# Patient Record
Sex: MALE | Race: WHITE
[De-identification: names, ages, dates, MRNs, and addresses within clinical notes are randomized per-mention and may not be internally consistent; named-entity substitution may affect disease eponyms.]

---

## 2019-12-03 ENCOUNTER — HOSPITAL ENCOUNTER (EMERGENCY)
Dept: HOSPITAL 92 - ERS | Age: 64
Discharge: HOME | End: 2019-12-03
Payer: SELF-PAY

## 2019-12-03 DIAGNOSIS — E56.9: ICD-10-CM

## 2019-12-03 DIAGNOSIS — R53.1: Primary | ICD-10-CM

## 2019-12-03 DIAGNOSIS — I11.0: ICD-10-CM

## 2019-12-03 DIAGNOSIS — I50.9: ICD-10-CM

## 2019-12-03 PROCEDURE — 99281 EMR DPT VST MAYX REQ PHY/QHP: CPT

## 2019-12-13 ENCOUNTER — HOSPITAL ENCOUNTER (INPATIENT)
Dept: HOSPITAL 92 - ERS | Age: 64
LOS: 9 days | Discharge: HOME | DRG: 223 | End: 2019-12-22
Attending: INTERNAL MEDICINE | Admitting: INTERNAL MEDICINE
Payer: SELF-PAY

## 2019-12-13 VITALS — BODY MASS INDEX: 32.6 KG/M2

## 2019-12-13 DIAGNOSIS — I42.8: ICD-10-CM

## 2019-12-13 DIAGNOSIS — J40: ICD-10-CM

## 2019-12-13 DIAGNOSIS — E87.1: ICD-10-CM

## 2019-12-13 DIAGNOSIS — I48.91: ICD-10-CM

## 2019-12-13 DIAGNOSIS — R19.7: ICD-10-CM

## 2019-12-13 DIAGNOSIS — E87.6: ICD-10-CM

## 2019-12-13 DIAGNOSIS — E66.9: ICD-10-CM

## 2019-12-13 DIAGNOSIS — I34.0: ICD-10-CM

## 2019-12-13 DIAGNOSIS — I25.10: ICD-10-CM

## 2019-12-13 DIAGNOSIS — D75.1: ICD-10-CM

## 2019-12-13 DIAGNOSIS — I47.2: ICD-10-CM

## 2019-12-13 DIAGNOSIS — I50.23: ICD-10-CM

## 2019-12-13 DIAGNOSIS — I95.9: ICD-10-CM

## 2019-12-13 DIAGNOSIS — I11.0: Primary | ICD-10-CM

## 2019-12-13 LAB
ALBUMIN SERPL BCG-MCNC: 2.8 G/DL (ref 3.4–4.8)
ALP SERPL-CCNC: 72 U/L (ref 40–110)
ALT SERPL W P-5'-P-CCNC: 19 U/L (ref 8–55)
ANION GAP SERPL CALC-SCNC: 14 MMOL/L (ref 10–20)
AST SERPL-CCNC: 32 U/L (ref 5–34)
BILIRUB SERPL-MCNC: 2.3 MG/DL (ref 0.2–1.2)
BUN SERPL-MCNC: 24 MG/DL (ref 8.4–25.7)
CALCIUM SERPL-MCNC: 8.2 MG/DL (ref 7.8–10.44)
CHLORIDE SERPL-SCNC: 103 MMOL/L (ref 98–107)
CO2 SERPL-SCNC: 29 MMOL/L (ref 23–31)
CREAT CL PREDICTED SERPL C-G-VRATE: 0 ML/MIN (ref 70–130)
GLOBULIN SER CALC-MCNC: 3.1 G/DL (ref 2.4–3.5)
GLUCOSE SERPL-MCNC: 111 MG/DL (ref 80–115)
HGB BLD-MCNC: 18.3 G/DL (ref 14–18)
MCH RBC QN AUTO: 31.6 PG (ref 27–31)
MCV RBC AUTO: 97.8 FL (ref 78–98)
MDIFF COMPLETE?: YES
PLATELET # BLD AUTO: 130 THOU/UL (ref 130–400)
POTASSIUM SERPL-SCNC: 3.8 MMOL/L (ref 3.5–5.1)
RBC # BLD AUTO: 5.81 MILL/UL (ref 4.7–6.1)
SODIUM SERPL-SCNC: 142 MMOL/L (ref 136–145)
TROPONIN I SERPL DL<=0.01 NG/ML-MCNC: 0.02 NG/ML (ref ?–0.03)
TROPONIN I SERPL DL<=0.01 NG/ML-MCNC: 0.02 NG/ML (ref ?–0.03)
WBC # BLD AUTO: 7.7 THOU/UL (ref 4.8–10.8)

## 2019-12-13 PROCEDURE — 93005 ELECTROCARDIOGRAM TRACING: CPT

## 2019-12-13 PROCEDURE — 36005 INJECTION EXT VENOGRAPHY: CPT

## 2019-12-13 PROCEDURE — 80053 COMPREHEN METABOLIC PANEL: CPT

## 2019-12-13 PROCEDURE — 85049 AUTOMATED PLATELET COUNT: CPT

## 2019-12-13 PROCEDURE — 80048 BASIC METABOLIC PNL TOTAL CA: CPT

## 2019-12-13 PROCEDURE — 75820 VEIN X-RAY ARM/LEG: CPT

## 2019-12-13 PROCEDURE — 76705 ECHO EXAM OF ABDOMEN: CPT

## 2019-12-13 PROCEDURE — 85014 HEMATOCRIT: CPT

## 2019-12-13 PROCEDURE — 96374 THER/PROPH/DIAG INJ IV PUSH: CPT

## 2019-12-13 PROCEDURE — 83880 ASSAY OF NATRIURETIC PEPTIDE: CPT

## 2019-12-13 PROCEDURE — 36416 COLLJ CAPILLARY BLOOD SPEC: CPT

## 2019-12-13 PROCEDURE — 36415 COLL VENOUS BLD VENIPUNCTURE: CPT

## 2019-12-13 PROCEDURE — 85018 HEMOGLOBIN: CPT

## 2019-12-13 PROCEDURE — 84484 ASSAY OF TROPONIN QUANT: CPT

## 2019-12-13 PROCEDURE — 99153 MOD SED SAME PHYS/QHP EA: CPT

## 2019-12-13 PROCEDURE — 93798 PHYS/QHP OP CAR RHAB W/ECG: CPT

## 2019-12-13 PROCEDURE — 82565 ASSAY OF CREATININE: CPT

## 2019-12-13 PROCEDURE — 94760 N-INVAS EAR/PLS OXIMETRY 1: CPT

## 2019-12-13 PROCEDURE — 33240 INSRT PULSE GEN W/SINGL LEAD: CPT

## 2019-12-13 PROCEDURE — 93306 TTE W/DOPPLER COMPLETE: CPT

## 2019-12-13 PROCEDURE — 99152 MOD SED SAME PHYS/QHP 5/>YRS: CPT

## 2019-12-13 PROCEDURE — 71045 X-RAY EXAM CHEST 1 VIEW: CPT

## 2019-12-13 PROCEDURE — C1769 GUIDE WIRE: HCPCS

## 2019-12-13 PROCEDURE — 85027 COMPLETE CBC AUTOMATED: CPT

## 2019-12-13 PROCEDURE — 85025 COMPLETE CBC W/AUTO DIFF WBC: CPT

## 2019-12-13 PROCEDURE — 93458 L HRT ARTERY/VENTRICLE ANGIO: CPT

## 2019-12-13 RX ADMIN — Medication SCH ML: at 21:00

## 2019-12-13 NOTE — RAD
XR Chest 1 View Portable



HISTORY: Cough



COMPARISON: 1/3/2012



FINDINGS: The heart size is enlarged. The lungs are well expanded without focal areas of consolidatio
n, marcelo pulmonary edema pneumothorax or pleural effusions.



IMPRESSION: No radiographic evidence of acute cardiopulmonary process.



Reported By: Vince Calvert 

Electronically Signed:  12/13/2019 3:22 PM

## 2019-12-14 LAB
ANION GAP SERPL CALC-SCNC: 11 MMOL/L (ref 10–20)
BASOPHILS # BLD AUTO: 0.1 THOU/UL (ref 0–0.2)
BASOPHILS NFR BLD AUTO: 1.4 % (ref 0–1)
BUN SERPL-MCNC: 21 MG/DL (ref 8.4–25.7)
CALCIUM SERPL-MCNC: 8.1 MG/DL (ref 7.8–10.44)
CHLORIDE SERPL-SCNC: 100 MMOL/L (ref 98–107)
CO2 SERPL-SCNC: 34 MMOL/L (ref 23–31)
CREAT CL PREDICTED SERPL C-G-VRATE: 130 ML/MIN (ref 70–130)
CREAT CL PREDICTED SERPL C-G-VRATE: 143 ML/MIN (ref 70–130)
EOSINOPHIL # BLD AUTO: 0 THOU/UL (ref 0–0.7)
EOSINOPHIL NFR BLD AUTO: 0.6 % (ref 0–10)
GLUCOSE SERPL-MCNC: 87 MG/DL (ref 80–115)
HGB BLD-MCNC: 17.1 G/DL (ref 14–18)
HGB BLD-MCNC: 18.2 G/DL (ref 14–18)
LYMPHOCYTES # BLD: 2.3 THOU/UL (ref 1.2–3.4)
LYMPHOCYTES NFR BLD AUTO: 35.5 % (ref 21–51)
MCH RBC QN AUTO: 31.8 PG (ref 27–31)
MCV RBC AUTO: 98 FL (ref 78–98)
MONOCYTES # BLD AUTO: 0.5 THOU/UL (ref 0.11–0.59)
MONOCYTES NFR BLD AUTO: 8.3 % (ref 0–10)
NEUTROPHILS # BLD AUTO: 3.5 THOU/UL (ref 1.4–6.5)
NEUTROPHILS NFR BLD AUTO: 54.1 % (ref 42–75)
PLATELET # BLD AUTO: 123 THOU/UL (ref 130–400)
PLATELET # BLD AUTO: 135 THOU/UL (ref 130–400)
POTASSIUM SERPL-SCNC: 3.4 MMOL/L (ref 3.5–5.1)
RBC # BLD AUTO: 5.39 MILL/UL (ref 4.7–6.1)
SODIUM SERPL-SCNC: 142 MMOL/L (ref 136–145)
WBC # BLD AUTO: 6.4 THOU/UL (ref 4.8–10.8)

## 2019-12-14 RX ADMIN — ASPIRIN 81 MG CHEWABLE TABLET SCH MG: 81 TABLET CHEWABLE at 08:47

## 2019-12-14 RX ADMIN — Medication SCH ML: at 08:48

## 2019-12-14 RX ADMIN — Medication SCH ML: at 21:01

## 2019-12-14 NOTE — HP
REASON FOR ADMISSION:  Shortness of breath, swelling of lower extremities.



HISTORY OF PRESENT ILLNESS:  This is a 64-year-old male patient, presenting to the

emergency room for increased swelling of his bilateral lower extremities.  History

going back to weeks before his presentation, he has been noticing worsening of his

chronic lower extremity swelling that has started in June and the swelling was up to

his knees, then progressed to his groin and when he goes to work and stands a lot on

his feet, his scrotum becomes very swollen and for the past couple of weeks, he has

been coughing and getting more swelling extending to his abdominal area and getting

very short of breath with ambulation, unable to lay flat on his back.  Yesterday, he

had an episode of diarrhea which made his weakness and fatigue worse.  His last

bowel movement was this morning. 



The patient's last admission to the hospital was in 2012 for congestive heart

failure.  As per him, his ejection fraction was 20%.  Since then, he follows with

his primary care physician and takes medication for his high blood pressure. 



PAST MEDICAL HISTORY:  

1. High blood pressure.

2. Atrial fibrillation.  The patient is not on any anticoagulation except for

full-dose aspirin. 

3. Congestive heart failure, as per patient, EF 20%. 

4. Coronary artery disease, but no stents and no history of MI.



FAMILY HISTORY:  Borderline diabetes.



SOCIAL HISTORY:  He does not smoke.  Does not drink alcohol.



REVIEW OF SYSTEMS:  All systems reviewed except the above-mentioned, found to be

negative. 



PHYSICAL EXAMINATION:

GENERAL:  Awake, alert, oriented, does not appear in distress. 

VITAL SIGNS:  His blood pressure is 155/93, heart rate 70, afebrile, pulse ox above

90%. 

HEENT:  Head is nontraumatic, normocephalic.  Pupils equal, reactive.  Extraocular

movements are intact.  Nonicteric sclerae.  Well injected conjunctivae.  Oral mucosa

normal.  Nasal mucosa normal. 

NECK:  Supple.  No adenopathy.  No murmur.  Thyroid palpable.  Trachea is midline.

No supraclavicular adenopathy. 

CARDIAC:  S1 and S2, irregular.  No murmurs. No gallops.  No friction rubs noted.

No displacement of PMI. 

LUNGS:  Fine inspiratory crackles bilaterally. 

ABDOMEN:  Bowel sounds are positive.  Nontender abdomen. 

EXTREMITIES:  He does have 2+ pitting edema in bilateral lower extremities.  Slight

edema of his scrotum area. 

NEUROLOGICAL:  Cranial nerves 2 through 12 within normal limit.  Normal motor

function.  Normal sensory function.  Normal reflexes. 



LABORATORY DATA:  Blood work shows WBC 7.7, hemoglobin 18.3, platelets of 130.

Sodium 142, potassium 3.8, BUN 24, creatinine 0.93.  Chest x-ray shows no

radiographic evidence of acute cardiopulmonary process.  EKG as per my read shows

controlled rate, atrial fibrillation. 



ASSESSMENT AND PLAN:  This is a 64-year-old male patient presenting with increased

swelling of his bilateral lower extremities, extending to his abdominal area, also

shortness of breath, also coughing compatible with anasarca.  He does have history

of congestive heart failure with an EF of 20%. 

1. Cardiac.  The patient to be admitted to telemetry and would be on aggressive

diuresis with IV Lasix.  He is on oral Lasix at home, we will convert it to IV, we

will double the dose.  He does have history of atrial fibrillation and he is in

atrial fibrillation, but he is not on any anticoagulation.  We will start him on

Eliquis 5 mg b.i.d., we will consult Cardiology, we will do an echocardiogram, we

will continue his metoprolol and lisinopril for better blood pressure control; but

on the other hand, I will decrease his full dose aspirin to baby aspirin since he

will be on Eliquis to prevent increased risk of bleed.  We will also have him on

hydralazine as needed for high blood pressure. 

2. For deep vein thrombosis prophylaxis, he will be on Eliquis.







Job ID:  [id number]

## 2019-12-14 NOTE — CON
DATE OF CONSULTATION:  



HISTORY OF PRESENT ILLNESS:  The patient is a 64-year-old gentleman, who 
presents for evaluation of dyspnea and lower extremity swelling.  The patient 
has a history

of a cardiomyopathy. He  was seen in 2012 with a severe cardiomyopathy.  The 
patient declined to undergo an invasive evaluation.  The patient was placed on 
medical

therapy.  He has been followed by his primary physician.  The patient presents 
with increasing lower extremity swelling.  He reports being markedly dyspneic.  
The

patient has PND and orthopnea.  The patient denies having any chest discomfort. 



PAST MEDICAL HISTORY:  

1. Cardiomyopathy.

2. Atrial fibrillation.

3. Hypertension.



PAST SURGICAL HISTORY:  None.



SOCIAL HISTORY:   He is a former smoker.



MEDICATIONS: 

1. Aspirin 325 daily.

2. Lasix 40 daily.

3. Lisinopril 25 b.i.d.

4. Metoprolol 100 XL b.i.d.



FAMILY HISTORY:  No strong family history of heart disease.



ALLERGIES:  NO KNOWN DRUG ALLERGIES.



REVIEW OF SYSTEMS:  Remarkable only for nasal congestion.



PHYSICAL EXAMINATION:

GENERAL:  Obese gentleman, in no acute distress. 

VITAL SIGNS:  Blood pressure 134/84. 

NECK:  Full. 

LUNGS:  Few crackles in both bases. 

HEART:  Regular rate and rhythm.  Normal S1 and S2.  1/6 systolic murmur. 

ABDOMEN:  Distended. 

EXTREMITIES:  Showed severe bilateral edema.



LABORATORY RESULTS:  Sodium 142, potassium 3.4, chloride 100, bicarbonate 34, 
BUN

21, and creatinine 0.83.  Troponin was 0.024.  White blood cell count 6.4,

hemoglobin 17.1, hematocrit 52.8, and his platelets are 123. 



IMAGING DATA:  EKG revealed atrial fibrillation with a left anterior fascicular 
block. 



IMPRESSION:  

1. Congestive heart failure, acute on chronic, systolic.

2. Cardiomyopathy.

3. Severe mitral regurgitation.

4. Hypertension.

5. Obesity.

6. Atrial fibrillation. 



This gentleman presents with severe congestive heart failure.  The patient will 
be diuresed with IV Lasix.  He will also be started on spironolactone.  We 
would highly

recommend the patient be placed on Entresto instead of lisinopril.  We will 
discontinue Lisinopril.  I recommend the patient undergo a cardiac

catheterization, which the patient is now agreeable to proceed.  We will also 
switch to Coreg.  The patient's prognosis is guarded.  We will follow this 
patient with you

through his hospitalization.  







Job ID:  [id number]



[facility]

## 2019-12-14 NOTE — PDOC.HOSPP
- Subjective


Encounter Date: 12/14/19


Encounter Time: 10:23


Subjective: 





Doing a little better.  Reports a hx of initial diagnosis of cardiomyopathy in 

2012 here.  Had an echo and stress test.  Those results are not avail to me on 

this system right now.  He does not recal having a cath at that time and does 

not recall anyone telling him why he had a cardiomyopathy.  He did drink and 

smoke heavily but quit in 1986.  





He was told in 2012 that he had afib as well.  He was never on anticoagulation. 





He has had a cough that has been productive of mostly clear, frothy sputum.  He 

has been taking mucinex DM and has on occasion has some discolored sputum.  





He was prompted to present here because of the discomfort associated with 

scrotal edema and the new symptom of diarrhea.  





- Objective


Vital Signs & Weight: 


 Vital Signs (12 hours)











  Temp Pulse Resp BP BP Pulse Ox


 


 12/14/19 08:42  97.5 F L  85  16  160/94 H   94 L


 


 12/14/19 03:31  97.8 F  72  18   144/84 H  93 L


 


 12/14/19 00:00   95    138/67 








 Weight











Weight                         240 lb 11.2 oz














I&O: 


 











 12/13/19 12/14/19 12/15/19





 06:59 06:59 06:59


 


Intake Total  340 


 


Output Total  1750 


 


Balance  -1410 











Result Diagrams: 


 12/14/19 09:57





 12/14/19 04:07





Hospitalist ROS





- Medication


Medications: 


Active Medications











Generic Name Dose Route Start Last Admin





  Trade Name Freq  PRN Reason Stop Dose Admin


 


Aspirin  81 mg  12/14/19 09:00  12/14/19 08:47





  Aspirin Chewable  PO   81 mg





  DAILY IZA   Administration





     





     





     





     


 


Furosemide  40 mg  12/13/19 18:00  12/14/19 05:09





  Lasix  IVP   40 mg





  Q12H IZA   Administration





     





     





     





     


 


Lisinopril  20 mg  12/13/19 21:00  12/14/19 08:47





  Zestril  PO   20 mg





  BID IZA   Administration





     





     





     





     


 


Metoprolol Tartrate  100 mg  12/13/19 21:00  12/14/19 08:47





  Lopressor  PO   100 mg





  BID IZA   Administration





     





     





     





     


 


Sodium Chloride  10 ml  12/13/19 21:00  12/14/19 08:48





  Flush - Normal Saline  IVF   10 ml





  Q12HR IZA   Administration





     





     





     





     














- Exam


General Appearance: NAD, awake alert


General - other findings: Obese.  Obviously very edematous in general. 


Neck: supple, symmetric, no JVD, no thyromegaly, no lymphadenopathy, no carotid 

bruit


Heart: no murmur, no gallops, no rubs, irregular


Respiratory: no rales (Scattered bilaterally.), no ronchi, normal chest 

expansion, no tachypnea


Gastrointestinal: soft, non-tender, non-distended, normal bowel sounds, no 

palpable masses, no hepatomegaly, no splenomegaly, no bruit


Gastrointestinal - other findings: Pitting edema of the abdomen. 


Extremities: 2+ LE edema


Skin: normal turgor


Neurological: no focal deficits


Musculoskeletal: normal tone, normal strength, no muscle wasting


Psychiatric: normal affect, normal behavior, A&O x 3





Hosp A/P


(1) Cardiac volume overload


Code(s): E87.79 - OTHER FLUID OVERLOAD   Status: Acute   





(2) Acute on chronic systolic and diastolic heart failure, NYHA class 3


Code(s): I50.43 - ACUTE ON CHRONIC COMBINED SYSTOLIC AND DIASTOLIC HRT FAIL   

Status: Acute   





(3) Cardiomyopathy


Code(s): I42.9 - CARDIOMYOPATHY, UNSPECIFIED   Status: Chronic   





(4) Bronchitis


Code(s): J40 - BRONCHITIS, NOT SPECIFIED AS ACUTE OR CHRONIC   Status: Acute   





(5) Diarrhea


Code(s): R19.7 - DIARRHEA, UNSPECIFIED   Status: Acute   





(6) Obesity (BMI 30.0-34.9)


Code(s): E66.9 - OBESITY, UNSPECIFIED   Status: Chronic   





(7) Elevated bilirubin


Code(s): R17 - UNSPECIFIED JAUNDICE   Status: Acute   





(8) Atrial fibrillation


Code(s): I48.91 - UNSPECIFIED ATRIAL FIBRILLATION   Status: Chronic   





(9) HTN (hypertension)


Code(s): I10 - ESSENTIAL (PRIMARY) HYPERTENSION   Status: Chronic   





(10) Polycythemia


Code(s): D75.1 - SECONDARY POLYCYTHEMIA   Status: Acute   





(11) Hypokalemia


Code(s): E87.6 - HYPOKALEMIA   Status: Acute   





- Plan





Volume Overload:


Secondary to CHF.


Continue diuresis with IV lasix.  Seems to be working well now.





CHF:


Continue diuresis.


Reports a hx of EF of 20% from echo from 2012.


Repeat echo pending. 


Cardiology consult pending.





Cardiomyopathy:


Unclear etiology.


Hopefully, the cardiologist will be able to pull records and see if he had a 

cath in 2012.  If not, he will likely need one to rule out ischemic etiology.


No other readily identifiable etiology.


Continue afterload reduction and beta blocker.





Bronchitis:


Start po ceftin in light of the decompensated CHF.





Diarrhea:


May have been related to the OTC Guaifenesin DM.


Could possibly be related to bowel edema.


Does not appear to be an ongoing problem for him.  


Adding Florastor.





Elevated Bili:


Likely passive liver congestion from CHF.


Continue to monitor.





Atrial fibrillation:


Rate well controlled.


Sounds like it is chronic and was known to be present in 2012.


Was started on Eliquis.  However, I will hold stop that for now in anticipation 

of possible cath.  


Start Lovenox. 


Continue beta blocker.





HTN:


Continue Lisinopril, metoprolol.





Polycythemia:


May be secondary to chronic chf.





Hypokalemia:


Oral repletion with additional dose later in light of ongoing diuresis.





DVT proph:


Lovenox given at therapeutic dose for afib.





PUD proph:


H2 antagonist.

## 2019-12-15 LAB
ANION GAP SERPL CALC-SCNC: 16 MMOL/L (ref 10–20)
BUN SERPL-MCNC: 17 MG/DL (ref 8.4–25.7)
CALCIUM SERPL-MCNC: 9.4 MG/DL (ref 7.8–10.44)
CHLORIDE SERPL-SCNC: 90 MMOL/L (ref 98–107)
CO2 SERPL-SCNC: 40 MMOL/L (ref 23–31)
CREAT CL PREDICTED SERPL C-G-VRATE: 111 ML/MIN (ref 70–130)
GLUCOSE SERPL-MCNC: 121 MG/DL (ref 80–115)
POTASSIUM SERPL-SCNC: 4.2 MMOL/L (ref 3.5–5.1)
SODIUM SERPL-SCNC: 142 MMOL/L (ref 136–145)

## 2019-12-15 RX ADMIN — Medication SCH ML: at 20:27

## 2019-12-15 RX ADMIN — ASPIRIN 81 MG CHEWABLE TABLET SCH MG: 81 TABLET CHEWABLE at 08:55

## 2019-12-15 RX ADMIN — Medication SCH ML: at 16:32

## 2019-12-15 NOTE — PDOC.HOSPP
- Subjective


Subjective: 





Feeling some better.  He is voiding well with the lasix. 





- Objective


Vital Signs & Weight: 


 Vital Signs (12 hours)











  Temp Pulse Resp BP Pulse Ox


 


 12/15/19 11:43  97.7 F  79  18  109/67  94 L


 


 12/15/19 07:52  97.3 F L  85  18  146/89 H  93 L


 


 12/15/19 07:45      95


 


 12/15/19 05:00  97.5 F L  80  20  131/79  92 L








 Weight











Weight                         229 lb 9.6 oz














I&O: 


 











 12/14/19 12/15/19 12/16/19





 06:59 06:59 06:59


 


Intake Total 340 1200 


 


Output Total 8977 5424 


 


Balance -6259 -1664 











Result Diagrams: 


 12/14/19 09:57





 12/15/19 09:07





Hospitalist ROS





- Medication


Medications: 


Active Medications











Generic Name Dose Route Start Last Admin





  Trade Name Freq  PRN Reason Stop Dose Admin


 


Aspirin  81 mg  12/14/19 09:00  12/15/19 08:55





  Aspirin Chewable  PO   81 mg





  DAILY IZA   Administration





     





     





     





     


 


Carvedilol  25 mg  12/14/19 17:00  12/15/19 08:54





  Coreg  PO   25 mg





  BID-WM IZA   Administration





     





     





     





     


 


Cefuroxime Axetil  250 mg  12/14/19 21:00  12/15/19 08:55





  Ceftin  PO   250 mg





  Q12HR IZA   Administration





     





     





     





     


 


Enoxaparin Sodium  100 mg  12/14/19 21:00  12/15/19 08:55





  Lovenox  SC  12/15/19 23:59  100 mg





  0900,2100 IZA   Administration





     





     





     





     


 


Famotidine  20 mg  12/14/19 21:00  12/15/19 08:55





  Pepcid  PO   20 mg





  BID IZA   Administration





     





     





     





     


 


Furosemide  80 mg  12/14/19 16:00  12/15/19 06:02





  Lasix  IVP   80 mg





  0400,1600 IZA   Administration





     





     





     





     


 


Saccharomyces Boulardii  250 mg  12/14/19 21:00  12/15/19 08:55





  Florastor  PO   250 mg





  BID IZA   Administration





     





     





     





     


 


Sodium Chloride  10 ml  12/13/19 21:00  12/14/19 21:01





  Flush - Normal Saline  IVF   10 ml





  Q12HR IZA   Administration





     





     





     





     


 


Sodium Chloride  10 ml  12/13/19 18:16  12/15/19 06:02





  Flush - Normal Saline  IVF   10 ml





  PRN PRN   Administration





  Saline Flush   





     





     





     


 


Spironolactone  25 mg  12/14/19 16:00  12/15/19 08:55





  Aldactone  PO   25 mg





  [facility]   Administration





     





     





     





     














- Exam


General Appearance: NAD, awake alert


Heart: RRR, no murmur, no gallops, no rubs, normal peripheral pulses


Respiratory: CTAB, no wheezes, no rales, no ronchi, normal chest expansion, no 

tachypnea, normal percussion


Gastrointestinal: soft, non-tender, non-distended, normal bowel sounds, no 

palpable masses, no hepatomegaly, no splenomegaly, no bruit


Extremities: 2+ LE edema


Skin: normal turgor


Neurological: no focal deficits


Musculoskeletal: normal tone


Psychiatric: normal affect, normal behavior, A&O x 3





Hosp A/P


(1) Cardiac volume overload


Code(s): E87.79 - OTHER FLUID OVERLOAD   Status: Acute   





(2) Acute on chronic systolic and diastolic heart failure, NYHA class 3


Code(s): I50.43 - ACUTE ON CHRONIC COMBINED SYSTOLIC AND DIASTOLIC HRT FAIL   

Status: Acute   





(3) Cardiomyopathy


Code(s): I42.9 - CARDIOMYOPATHY, UNSPECIFIED   Status: Chronic   





(4) Bronchitis


Code(s): J40 - BRONCHITIS, NOT SPECIFIED AS ACUTE OR CHRONIC   Status: Acute   





(5) Diarrhea


Code(s): R19.7 - DIARRHEA, UNSPECIFIED   Status: Acute   





(6) Obesity (BMI 30.0-34.9)


Code(s): E66.9 - OBESITY, UNSPECIFIED   Status: Chronic   





(7) Elevated bilirubin


Code(s): R17 - UNSPECIFIED JAUNDICE   Status: Acute   





(8) Atrial fibrillation


Code(s): I48.91 - UNSPECIFIED ATRIAL FIBRILLATION   Status: Chronic   





(9) HTN (hypertension)


Code(s): I10 - ESSENTIAL (PRIMARY) HYPERTENSION   Status: Chronic   





(10) Polycythemia


Code(s): D75.1 - SECONDARY POLYCYTHEMIA   Status: Acute   





(11) Hypokalemia


Code(s): E87.6 - HYPOKALEMIA   Status: Acute   





- Plan





Volume Overload:


Secondary to CHF.


Continue diuresis with IV lasix.  Seems to be working well now.


Aldactone added by Cards. 





CHF:


Continue diuresis.  Working well.


Reports a hx of EF of 20% from echo from 2012.


Repeat echo pending. 


Cardiology following.


Stopped ACEI to start Entresto after washout period.





Cardiomyopathy:


Unclear etiology.


apparently did not have cath in 2012.  Willing to do that this time.


No other readily identifiable etiology.


Continue afterload reduction and beta blocker.





Bronchitis:


Start po ceftin in light of the decompensated CHF.





Diarrhea:


May have been related to the OTC Guaifenesin DM.


Could possibly be related to bowel edema.


Does not appear to be an ongoing problem for him.  


Adding Florastor.





Elevated Bili:


Likely passive liver congestion from CHF.


Continue to monitor.





Atrial fibrillation:


Rate well controlled.


Sounds like it is chronic and was known to be present in 2012.


Was started on Eliquis.  However, I will hold stop that for now in anticipation 

of possible cath.  


Start Lovenox. 


Continue beta blocker.





HTN:


Continue metoprolol.


ACEI held for washout period to start Entresto.





Polycythemia:


May be secondary to chronic chf.





Hypokalemia:


Oral repletion with additional dose later in light of ongoing diuresis.





DVT proph:


Lovenox given at therapeutic dose for afib.





PUD proph:


H2 antagonist.

## 2019-12-16 LAB
ANION GAP SERPL CALC-SCNC: 19 MMOL/L (ref 10–20)
BASOPHILS # BLD AUTO: 0 THOU/UL (ref 0–0.2)
BASOPHILS NFR BLD AUTO: 0.5 % (ref 0–1)
BUN SERPL-MCNC: 21 MG/DL (ref 8.4–25.7)
CALCIUM SERPL-MCNC: 9.2 MG/DL (ref 7.8–10.44)
CHLORIDE SERPL-SCNC: 87 MMOL/L (ref 98–107)
CO2 SERPL-SCNC: 33 MMOL/L (ref 23–31)
CREAT CL PREDICTED SERPL C-G-VRATE: 104 ML/MIN (ref 70–130)
EOSINOPHIL # BLD AUTO: 0.1 THOU/UL (ref 0–0.7)
EOSINOPHIL NFR BLD AUTO: 1.2 % (ref 0–10)
GLUCOSE SERPL-MCNC: 106 MG/DL (ref 80–115)
HGB BLD-MCNC: 17.5 G/DL (ref 14–18)
LYMPHOCYTES # BLD: 2 THOU/UL (ref 1.2–3.4)
LYMPHOCYTES NFR BLD AUTO: 38.7 % (ref 21–51)
MCH RBC QN AUTO: 31.3 PG (ref 27–31)
MCV RBC AUTO: 95.6 FL (ref 78–98)
MONOCYTES # BLD AUTO: 0.3 THOU/UL (ref 0.11–0.59)
MONOCYTES NFR BLD AUTO: 5.5 % (ref 0–10)
NEUTROPHILS # BLD AUTO: 2.8 THOU/UL (ref 1.4–6.5)
NEUTROPHILS NFR BLD AUTO: 54.1 % (ref 42–75)
PLATELET # BLD AUTO: 158 THOU/UL (ref 130–400)
POTASSIUM SERPL-SCNC: 3.7 MMOL/L (ref 3.5–5.1)
RBC # BLD AUTO: 5.59 MILL/UL (ref 4.7–6.1)
SODIUM SERPL-SCNC: 135 MMOL/L (ref 136–145)
WBC # BLD AUTO: 5.3 THOU/UL (ref 4.8–10.8)

## 2019-12-16 PROCEDURE — B2151ZZ FLUOROSCOPY OF LEFT HEART USING LOW OSMOLAR CONTRAST: ICD-10-PCS | Performed by: INTERNAL MEDICINE

## 2019-12-16 PROCEDURE — B2111ZZ FLUOROSCOPY OF MULTIPLE CORONARY ARTERIES USING LOW OSMOLAR CONTRAST: ICD-10-PCS | Performed by: INTERNAL MEDICINE

## 2019-12-16 PROCEDURE — 4A023N7 MEASUREMENT OF CARDIAC SAMPLING AND PRESSURE, LEFT HEART, PERCUTANEOUS APPROACH: ICD-10-PCS | Performed by: INTERNAL MEDICINE

## 2019-12-16 RX ADMIN — ASPIRIN 81 MG CHEWABLE TABLET SCH MG: 81 TABLET CHEWABLE at 08:04

## 2019-12-16 RX ADMIN — Medication SCH ML: at 08:04

## 2019-12-16 RX ADMIN — Medication SCH ML: at 21:04

## 2019-12-16 NOTE — PDOC.HOSPP
- Subjective


Subjective: 





Doing well overall.  He tolerated the cath well.  Knows the results.  





- Objective


Vital Signs & Weight: 


 Vital Signs (12 hours)











  Temp Pulse Resp BP Pulse Ox


 


 12/16/19 12:00  97.7 F  88  16  125/76  87 L


 


 12/16/19 11:05  97.8 F  101 H  16  125/85  94 L


 


 12/16/19 08:00  97.2 F L  88  18  119/72  93 L


 


 12/16/19 07:43      95


 


 12/16/19 04:30  98.1 F  75  22 H  122/76  95








 Weight











Weight                         219 lb 8 oz














I&O: 


 











 12/15/19 12/16/19 12/17/19





 06:59 06:59 06:59


 


Intake Total 1200 1600 


 


Output Total 6506 5287 


 


Balance -1691 -4285 











Result Diagrams: 


 12/16/19 04:34





 12/16/19 04:34





Hospitalist ROS





- Medication


Medications: 


Active Medications











Generic Name Dose Route Start Last Admin





  Trade Name Freq  PRN Reason Stop Dose Admin


 


Aspirin  81 mg  12/14/19 09:00  12/16/19 08:04





  Aspirin Chewable  PO   81 mg





  DAILY IZA   Administration





     





     





     





     


 


Carvedilol  25 mg  12/14/19 17:00  12/16/19 08:04





  Coreg  PO   25 mg





  BID-WM IZA   Administration





     





     





     





     


 


Cefuroxime Axetil  250 mg  12/14/19 21:00  12/16/19 08:03





  Ceftin  PO   250 mg





  Q12HR IZA   Administration





     





     





     





     


 


Famotidine  20 mg  12/14/19 21:00  12/16/19 08:03





  Pepcid  PO   20 mg





  BID IZA   Administration





     





     





     





     


 


Furosemide  80 mg  12/14/19 16:00  12/15/19 16:32





  Lasix  IVP   80 mg





  0400,1600 IZA   Administration





     





     





     





     


 


Saccharomyces Boulardii  250 mg  12/14/19 21:00  12/16/19 08:03





  Florastor  PO   250 mg





  BID IZA   Administration





     





     





     





     


 


Sodium Chloride  10 ml  12/13/19 21:00  12/16/19 08:04





  Flush - Normal Saline  IVF   10 ml





  Q12HR IZA   Administration





     





     





     





     


 


Sodium Chloride  10 ml  12/13/19 18:16  12/15/19 06:02





  Flush - Normal Saline  IVF   10 ml





  PRN PRN   Administration





  Saline Flush   





     





     





     


 


Spironolactone  25 mg  12/14/19 16:00  12/16/19 08:04





  Aldactone  PO   25 mg





  QAM-WM IZA   Administration





     





     





     





     














- Exam


General Appearance: NAD, awake alert


Neck: supple, symmetric, no JVD, no thyromegaly, no lymphadenopathy, no carotid 

bruit


Heart: RRR, no murmur, no gallops, no rubs, normal peripheral pulses


Respiratory: CTAB, no wheezes, no rales, no ronchi, normal chest expansion, no 

tachypnea, normal percussion


Gastrointestinal: soft, non-tender, non-distended, normal bowel sounds, no 

palpable masses, no hepatomegaly, no splenomegaly, no bruit


Extremities: 1+ LE edema


Neurological: no focal deficits


Musculoskeletal: normal tone


Psychiatric: normal affect, normal behavior, A&O x 3





Hosp A/P


(1) Cardiac volume overload


Code(s): E87.79 - OTHER FLUID OVERLOAD   Status: Acute   





(2) Acute on chronic systolic and diastolic heart failure, NYHA class 3


Code(s): I50.43 - ACUTE ON CHRONIC COMBINED SYSTOLIC AND DIASTOLIC HRT FAIL   

Status: Acute   





(3) Cardiomyopathy


Code(s): I42.9 - CARDIOMYOPATHY, UNSPECIFIED   Status: Chronic   





(4) Bronchitis


Code(s): J40 - BRONCHITIS, NOT SPECIFIED AS ACUTE OR CHRONIC   Status: Acute   





(5) Diarrhea


Code(s): R19.7 - DIARRHEA, UNSPECIFIED   Status: Acute   





(6) Obesity (BMI 30.0-34.9)


Code(s): E66.9 - OBESITY, UNSPECIFIED   Status: Chronic   





(7) Elevated bilirubin


Code(s): R17 - UNSPECIFIED JAUNDICE   Status: Acute   





(8) Atrial fibrillation


Code(s): I48.91 - UNSPECIFIED ATRIAL FIBRILLATION   Status: Chronic   





(9) HTN (hypertension)


Code(s): I10 - ESSENTIAL (PRIMARY) HYPERTENSION   Status: Chronic   





(10) Polycythemia


Code(s): D75.1 - SECONDARY POLYCYTHEMIA   Status: Acute   





(11) Hypokalemia


Code(s): E87.6 - HYPOKALEMIA   Status: Acute   





- Plan





Volume Overload:


Secondary to CHF.


Continue diuresis with IV lasix.  Seems to be working well now.


Aldactone added by Cards. 





CHF:


Continue diuresis.  Working well.


Reports a hx of EF of 20% from echo from 2012.


Repeat echo pending. 


Cardiology following.


Stopped ACEI to start Entresto after washout period.





Cardiomyopathy:


Unclear etiology.


apparently did not have cath in 2012.  Willing to do that this time.


No other readily identifiable etiology.


Continue afterload reduction and beta blocker.


Cath negative for significant ischemic disease.


Medical management recommended. 





Bronchitis:


Start po ceftin in light of the decompensated CHF.


Continues to have a little cough. 





Diarrhea:


May have been related to the OTC Guaifenesin DM.


Could possibly be related to bowel edema.


Does not appear to be an ongoing problem for him.  


Adding Florastor.


Improving.  





Elevated Bili:


Likely passive liver congestion from CHF.


Continue to monitor.





Atrial fibrillation:


Rate well controlled.


Sounds like it is chronic and was known to be present in 2012.


Was started on Eliquis.  However, I will hold stop that for now in anticipation 

of possible cath.  


Start Lovenox. 


Continue beta blocker.


May be able to transition to NOAC. 





HTN:


Continue metoprolol.


ACEI held for washout period to start Entresto.





Polycythemia:


May be secondary to chronic chf.





Hypokalemia:


Oral repletion with additional dose later in light of ongoing diuresis.





DVT proph:


Lovenox given at therapeutic dose for afib.





PUD proph:


H2 antagonist.

## 2019-12-17 LAB
ANION GAP SERPL CALC-SCNC: 14 MMOL/L (ref 10–20)
BUN SERPL-MCNC: 21 MG/DL (ref 8.4–25.7)
CALCIUM SERPL-MCNC: 9.1 MG/DL (ref 7.8–10.44)
CHLORIDE SERPL-SCNC: 87 MMOL/L (ref 98–107)
CO2 SERPL-SCNC: 37 MMOL/L (ref 23–31)
CREAT CL PREDICTED SERPL C-G-VRATE: 68 ML/MIN (ref 70–130)
CREAT CL PREDICTED SERPL C-G-VRATE: 89 ML/MIN (ref 70–130)
GLUCOSE SERPL-MCNC: 118 MG/DL (ref 80–115)
HGB BLD-MCNC: 18.4 G/DL (ref 14–18)
PLATELET # BLD AUTO: 173 THOU/UL (ref 130–400)
POTASSIUM SERPL-SCNC: 4 MMOL/L (ref 3.5–5.1)
SODIUM SERPL-SCNC: 134 MMOL/L (ref 136–145)

## 2019-12-17 RX ADMIN — ASPIRIN 81 MG CHEWABLE TABLET SCH MG: 81 TABLET CHEWABLE at 09:22

## 2019-12-17 RX ADMIN — Medication SCH ML: at 19:47

## 2019-12-17 RX ADMIN — Medication SCH ML: at 09:23

## 2019-12-17 NOTE — CON
DATE OF CONSULTATION:  12/16/2019



HISTORY OF PRESENT ILLNESS:  I am seeing Mr. Hernandes at our [facility] telemetry floor as an electrophysiology consultant.  His problems are: 

1. Acute on chronic systolic congestive heart failure with nonischemic

cardiomyopathy. 

a. Reduced LVEF on 2D echocardiogram is 15%-20% with moderate RV enlargement, mild

right atrial enlargement, severe mitral regurgitation, mild AI and TR, large and

heavy size. 

b. Left heart catheterization on 12/16/2019 demonstrates nonocclusive mild coronary

artery disease only, LVEF reduced in 15%. 

2. Nonsustained wide-complex tachycardia, likely ventricular tachycardia.

3. Hypertension.

4. Chronic atrial fibrillation.



ALLERGIES:  NONE NOTED.



MEDICATIONS:  At home included,

1. Vitamin D3.

2. Vitamin C.

3. Potassium gluconate.

4. Aspirin.

5. Mucinex.

6. Zinc.

7. Metoprolol tartrate 100 mg twice a day.

8. Lisinopril __________ p.o. twice a day.

9. Furosemide.



SUBJECTIVE:  Mr. Hernandes is here with heart failure exacerbation.  He was evaluated

by Dr. Castro with echocardiogram and left heart catheterization with the above

findings.  He is now feeling better with diuresis.  Blood pressure is still

borderline.  He does not pass out.  He does not feel more palpitations.  No fever,

chills, or cough. 

   

Rest of 12-point system otherwise unremarkable.



PAST MEDICAL HISTORY:  As above including hypertension, chronic atrial fibrillation,

not previous anticoagulation.  CHF, he does mentioned reduced LVEF back in 2012, but

has not been seen by cardiologist in the interim. 



SOCIAL HISTORY:  The patient denies smoking, EtOH, or drug abuse.



FAMILY HISTORY:  Significant for borderline diabetes.



OBJECTIVE DATA:  VITAL SIGNS:  Blood pressure is 108/72, heart rate ,

respirations 16, and temperature 97.8 degrees Fahrenheit. 

GENERAL:  Alert and oriented man, in no apparent distress. 

NECK:  Supple.  Jugular veins not distended. 

CHEST:  Coarse without crackles. 

HEART:  Sounds are regular to rate and rhythm.  No murmur or gallop. 

ABDOMEN:  Benign.  Bowel sounds positive. 

EXTREMITIES: Lower extremities without edema, clubbing, or cyanosis.



DATABASE:  EKG is reviewed, revealing atrial fibrillation rate of 82 beats per

minute, also ST-T changes.  Subsequent telemetry strips revealed continued atrial

fibrillation with a controlled ventricular rate, __________ nonsustained ventricular

tachycardia was noted. 



LABORATORY DATA:  White cell count of 5.3, hemoglobin 17.5, platelet count is 158.

Sodium 135, potassium 3.7, BUN is 21, creatinine 1.09.  Troponin was 0.02 and 0.024.

 BNP is 1519 on admission.  Chest x-ray was unremarkable. 



ASSESSMENT AND PLAN:  Mr. Hernandes is a 64-year-old man with remote history of

cardiomyopathy of which records are not available to me.  On the other hand, now

admitted with heart failure exacerbation with elevated BNP in the setting of

hypertension.  He is feeling better now with blood pressure better controlled and

also diuresed.  He underwent cardiac investigations above by Dr. Castro with

findings of severely reduced LVEF and a nonischemic cardiomyopathy. 

   

He also had nonsustained ventricular tachycardia. 

   

We discussed the risk of future ventricular arrhythmias with him.  He understands

the potential benefit from prophylactic ICD device.  At this point, he is hesitant

and he will receive further medical optimization with Dr. Castro and LifeVest is a

consideration for him.  I am happy to see him back in after 3 months of intensified

medical therapy, possibly including Entresto and reconsider him for ICD implant at

that time. 

   

Thank you again for letting me to participate in the care of this patient.







Job ID:  [id number]

## 2019-12-17 NOTE — PDOC.HOSPP
- Subjective


Subjective: 


Patient reports that he had one episode of dizziness this morning. 


Breathing well on room air in bed, but had some shortness of breath when 

walking to the bathroom. 


Reports that diarrhea has persisted. He had several episodes yesterday. 





- Objective


Vital Signs & Weight: 


 Vital Signs (12 hours)











  Temp Pulse Resp BP Pulse Ox


 


 12/17/19 07:35  98.1 F  85  20  98/61  97


 


 12/17/19 03:13  98.2 F  84  18  115/66  93 L


 


 12/16/19 23:58     100/57 L 








 Weight











Weight                         204 lb














I&O: 


 











 12/16/19 12/17/19 12/18/19





 06:59 06:59 06:59


 


Intake Total 1600 1060 


 


Output Total 7146 4470 


 


Balance -1892 -2581 











Result Diagrams: 


 12/16/19 04:34





 12/17/19 04:23





Hospitalist ROS





- Medication


Medications: 


Active Medications











Generic Name Dose Route Start Last Admin





  Trade Name Freq  PRN Reason Stop Dose Admin


 


Apixaban  5 mg  12/17/19 09:00  12/17/19 09:22





  Eliquis  PO   5 mg





  BID IZA   Administration





     





     





     





     


 


Aspirin  81 mg  12/14/19 09:00  12/17/19 09:22





  Aspirin Chewable  PO   81 mg





  DAILY IZA   Administration





     





     





     





     


 


Cefuroxime Axetil  250 mg  12/14/19 21:00  12/17/19 09:22





  Ceftin  PO   250 mg





  Q12HR IZA   Administration





     





     





     





     


 


Famotidine  20 mg  12/14/19 21:00  12/17/19 09:23





  Pepcid  PO   20 mg





  BID IZA   Administration





     





     





     





     


 


Furosemide  40 mg  12/17/19 09:00  12/17/19 09:23





  Lasix  PO   40 mg





  0900,1400 IZA   Administration





     





     





     





     


 


Saccharomyces Boulardii  250 mg  12/14/19 21:00  12/17/19 09:23





  Florastor  PO   250 mg





  BID IZA   Administration





     





     





     





     


 


Sacubitril/Valsartan  1 tab  12/16/19 21:00  12/17/19 09:23





  Entresto 24 Mg-26 Mg Tablet  PO   1 tab





  BID IZA   Administration





     





     





     





     


 


Sodium Chloride  10 ml  12/13/19 21:00  12/17/19 09:23





  Flush - Normal Saline  IVF   10 ml





  Q12HR IZA   Administration





     





     





     





     


 


Sodium Chloride  10 ml  12/13/19 18:16  12/15/19 06:02





  Flush - Normal Saline  IVF   10 ml





  PRN PRN   Administration





  Saline Flush   





     





     





     


 


Spironolactone  50 mg  12/17/19 09:00  12/17/19 09:24





  Aldactone  PO  12/17/19 11:00  50 mg





  NOW IZA   Administration





     





     





     





     














- Exam


General Appearance: NAD, awake alert


ENT: normocephalic atraumatic, no oropharyngeal lesions, moist mucosa


Heart: RRR, no murmur, no gallops, no rubs


Respiratory: CTAB, no wheezes, no rales, no ronchi, normal chest expansion, no 

tachypnea


Gastrointestinal: soft, non-tender, non-distended


Extremities: 1+ LE edema





Hosp A/P


(1) Cardiac volume overload


Code(s): E87.79 - OTHER FLUID OVERLOAD   Status: Acute   





(2) Acute on chronic systolic and diastolic heart failure, NYHA class 3


Code(s): I50.43 - ACUTE ON CHRONIC COMBINED SYSTOLIC AND DIASTOLIC HRT FAIL   

Status: Acute   





(3) Cardiomyopathy


Code(s): I42.9 - CARDIOMYOPATHY, UNSPECIFIED   Status: Chronic   





(4) Bronchitis


Code(s): J40 - BRONCHITIS, NOT SPECIFIED AS ACUTE OR CHRONIC   Status: Acute   





(5) Diarrhea


Code(s): R19.7 - DIARRHEA, UNSPECIFIED   Status: Acute   





(6) Obesity (BMI 30.0-34.9)


Code(s): E66.9 - OBESITY, UNSPECIFIED   Status: Chronic   





(7) Elevated bilirubin


Code(s): R17 - UNSPECIFIED JAUNDICE   Status: Acute   





(8) Atrial fibrillation


Code(s): I48.91 - UNSPECIFIED ATRIAL FIBRILLATION   Status: Chronic   





(9) HTN (hypertension)


Code(s): I10 - ESSENTIAL (PRIMARY) HYPERTENSION   Status: Chronic   





(10) Polycythemia


Code(s): D75.1 - SECONDARY POLYCYTHEMIA   Status: Acute   





(11) Hypokalemia


Code(s): E87.6 - HYPOKALEMIA   Status: Acute   





- Plan





Volume Overload:


Secondary to CHF.


Continue diuresis with IV lasix.  Seems to be working well now.


Aldactone added by Cards. 





CHF:


Continue diuresis.  Working well.


Reports a hx of EF of 20% from echo from 2012.


Repeat echo pending. 


Cardiology following.


Started on Entresto 12/16.


EP consulted. Pt decided to wait on AICD and attempt max medical management for 

3 months. 


Pt declined LifeVest at this time. 


Will follow with Matthew.





Cardiomyopathy:


Unclear etiology.


apparently did not have cath in 2012.  Willing to do that this time.


No other readily identifiable etiology.


Continue afterload reduction and beta blocker.


Cath negative for significant ischemic disease.


Medical management recommended. 





Bronchitis:


Start po ceftin in light of the decompensated CHF.


Continues to have a little cough when supine.





Diarrhea:


May have been related to the OTC Guaifenesin DM.


Could possibly be related to bowel edema.


Continue Florastor. 


Persistent, will continue to monitor.





Elevated Bili:


Likely passive liver congestion from CHF.


Continue to monitor.





Atrial fibrillation:


Rate well controlled.


Sounds like it is chronic and was known to be present in 2012.


On Eliquis.


Cath yesterday revealed no significant blockages.


Continue beta blocker.








HTN:


Continue metoprolol.


On Entresto.





Polycythemia:


May be secondary to chronic chf.





Hypokalemia:


Resolved.





DVT proph:


Lovenox given at therapeutic dose for afib.





PUD proph:


H2 antagonist.

## 2019-12-18 LAB
ANION GAP SERPL CALC-SCNC: 19 MMOL/L (ref 10–20)
BUN SERPL-MCNC: 25 MG/DL (ref 8.4–25.7)
CALCIUM SERPL-MCNC: 9.3 MG/DL (ref 7.8–10.44)
CHLORIDE SERPL-SCNC: 86 MMOL/L (ref 98–107)
CO2 SERPL-SCNC: 32 MMOL/L (ref 23–31)
CREAT CL PREDICTED SERPL C-G-VRATE: 76 ML/MIN (ref 70–130)
GLUCOSE SERPL-MCNC: 107 MG/DL (ref 80–115)
POTASSIUM SERPL-SCNC: 3.2 MMOL/L (ref 3.5–5.1)
SODIUM SERPL-SCNC: 134 MMOL/L (ref 136–145)

## 2019-12-18 RX ADMIN — Medication SCH ML: at 08:58

## 2019-12-18 RX ADMIN — ONDANSETRON PRN MG: 2 INJECTION INTRAMUSCULAR; INTRAVENOUS at 09:03

## 2019-12-18 RX ADMIN — Medication SCH ML: at 21:24

## 2019-12-18 RX ADMIN — ASPIRIN 81 MG CHEWABLE TABLET SCH MG: 81 TABLET CHEWABLE at 08:57

## 2019-12-18 NOTE — PDOC.HOSPP
- Subjective


Subjective: 





Pt reports some nausea and dizziness this morning. But otherwise states that he 

is feeling better.


He is eating and not having any difficulty currently. 


He reports that his diarrhea has resolved. 





- Objective


Vital Signs & Weight: 


 Vital Signs (12 hours)











  Temp Pulse Resp BP Pulse Ox


 


 12/18/19 08:48  97.8 F  85  17  145/81 H  96


 


 12/18/19 07:29      92 L


 


 12/18/19 03:34  98.8 F  77  18  123/66  92 L


 


 12/17/19 23:42   80  18  113/65 








 Weight











Weight                         201 lb














I&O: 


 











 12/17/19 12/18/19 12/19/19





 06:59 06:59 06:59


 


Intake Total 1060 1630 


 


Output Total 3620 2180 


 


Balance -2560 -550 











Result Diagrams: 


 12/17/19 10:27





 12/18/19 06:04





Hospitalist ROS





- Medication


Medications: 


Active Medications











Generic Name Dose Route Start Last Admin





  Trade Name Freq  PRN Reason Stop Dose Admin


 


Apixaban  5 mg  12/17/19 09:00  12/18/19 08:57





  Eliquis  PO   5 mg





  BID IZA   Administration





     





     





     





     


 


Aspirin  81 mg  12/14/19 09:00  12/18/19 08:57





  Aspirin Chewable  PO   81 mg





  DAILY IZA   Administration





     





     





     





     


 


Atorvastatin Calcium  40 mg  12/17/19 21:00  12/17/19 19:47





  Lipitor  PO   40 mg





  HS IZA   Administration





     





     





     





     


 


Carvedilol  6.25 mg  12/17/19 17:00  12/18/19 08:51





  Coreg  PO   6.25 mg





  BID-WM IZA   Administration





     





     





     





     


 


Cefuroxime Axetil  250 mg  12/14/19 21:00  12/18/19 08:52





  Ceftin  PO   250 mg





  Q12HR IZA   Administration





     





     





     





     


 


Famotidine  20 mg  12/14/19 21:00  12/18/19 08:52





  Pepcid  PO   20 mg





  BID IZA   Administration





     





     





     





     


 


Furosemide  40 mg  12/17/19 09:00  12/18/19 08:57





  Lasix  PO   40 mg





  0900,1400 IZA   Administration





     





     





     





     


 


Ondansetron HCl  4 mg  12/18/19 08:51  12/18/19 09:03





  Zofran  IVP   4 mg





  Q6H PRN   Administration





  Nausea/Vomiting   





     





     





     


 


Potassium Chloride  40 meq  12/18/19 08:00  12/18/19 08:51





  K-Dur  PO  12/18/19 10:00  40 meq





  NOW IZA   Administration





     





     





     





     


 


Saccharomyces Boulardii  250 mg  12/14/19 21:00  12/18/19 08:52





  Florastor  PO   250 mg





  BID IZA   Administration





     





     





     





     


 


Sacubitril/Valsartan  1 tab  12/16/19 21:00  12/18/19 08:57





  Entresto 24 Mg-26 Mg Tablet  PO   1 tab





  BID IZA   Administration





     





     





     





     


 


Sodium Chloride  10 ml  12/13/19 21:00  12/18/19 08:58





  Flush - Normal Saline  IVF   10 ml





  Q12HR IZA   Administration





     





     





     





     


 


Sodium Chloride  10 ml  12/13/19 18:16  12/15/19 06:02





  Flush - Normal Saline  IVF   10 ml





  PRN PRN   Administration





  Saline Flush   





     





     





     


 


Spironolactone  50 mg  12/18/19 08:00  12/18/19 08:51





  Aldactone  PO   50 mg





  QAM-WM IZA   Administration





     





     





     





     














- Exam


General Appearance: NAD, awake alert


Heart: RRR, no murmur, no gallops, no rubs


Respiratory: CTAB, no wheezes, no rales, no ronchi, normal chest expansion, no 

tachypnea


Extremities: no edema


Skin: tenting





Hosp A/P


(1) Cardiac volume overload


Code(s): E87.79 - OTHER FLUID OVERLOAD   Status: Acute   





(2) Acute on chronic systolic and diastolic heart failure, NYHA class 3


Code(s): I50.43 - ACUTE ON CHRONIC COMBINED SYSTOLIC AND DIASTOLIC HRT FAIL   

Status: Acute   





(3) Cardiomyopathy


Code(s): I42.9 - CARDIOMYOPATHY, UNSPECIFIED   Status: Chronic   





(4) Bronchitis


Code(s): J40 - BRONCHITIS, NOT SPECIFIED AS ACUTE OR CHRONIC   Status: Acute   





(5) Diarrhea


Code(s): R19.7 - DIARRHEA, UNSPECIFIED   Status: Acute   





(6) Obesity (BMI 30.0-34.9)


Code(s): E66.9 - OBESITY, UNSPECIFIED   Status: Chronic   





(7) Elevated bilirubin


Code(s): R17 - UNSPECIFIED JAUNDICE   Status: Acute   





(8) Atrial fibrillation


Code(s): I48.91 - UNSPECIFIED ATRIAL FIBRILLATION   Status: Chronic   





(9) HTN (hypertension)


Code(s): I10 - ESSENTIAL (PRIMARY) HYPERTENSION   Status: Chronic   





(10) Polycythemia


Code(s): D75.1 - SECONDARY POLYCYTHEMIA   Status: Acute   





(11) Hypokalemia


Code(s): E87.6 - HYPOKALEMIA   Status: Acute   





- Plan





Volume Overload:


Secondary to CHF.


Continue diuresis with PO Lasix and spironolactone.  Seems to be working well 

now.





CHF:


Started spironolactone. Will decrease Lasix to 40 mg PO QD.


Reports a hx of EF of 20% from echo from 2012. 


Started on Entresto 12/16.


EP consulted. Pt decided to wait on AICD and attempt max medical management for 

3 months. 


Pt declined LifeVest at this time. 


Will follow with Matthew.





Cardiomyopathy:


Non-ischemic.


Continue afterload reduction and beta blocker.


Cath negative for significant ischemic disease.





Bronchitis:


Start po ceftin in light of the decompensated CHF.


Continues to have some cough when supine, but reports it is improved.





Diarrhea:


Improved, but will continue to monitor.


Continue Florastor. 








Elevated Bili:


Likely passive liver congestion from CHF.


Continue to monitor.





Atrial fibrillation:


Rate well controlled.


Sounds like it is chronic and was known to be present in 2012.


On Eliquis.


Cath revealed no significant blockages.


Continue beta blocker.








HTN:


Continue metoprolol.


On Entresto.





Polycythemia:


May be secondary to chronic chf.





Hypokalemia:


Replaced orally.





DVT proph:


On Eliquis.





PUD proph:


H2 antagonist.

## 2019-12-19 LAB
ANION GAP SERPL CALC-SCNC: 15 MMOL/L (ref 10–20)
BUN SERPL-MCNC: 31 MG/DL (ref 8.4–25.7)
CALCIUM SERPL-MCNC: 9 MG/DL (ref 7.8–10.44)
CHLORIDE SERPL-SCNC: 88 MMOL/L (ref 98–107)
CO2 SERPL-SCNC: 38 MMOL/L (ref 23–31)
CREAT CL PREDICTED SERPL C-G-VRATE: 64 ML/MIN (ref 70–130)
GLUCOSE SERPL-MCNC: 104 MG/DL (ref 80–115)
POTASSIUM SERPL-SCNC: 4 MMOL/L (ref 3.5–5.1)
SODIUM SERPL-SCNC: 137 MMOL/L (ref 136–145)

## 2019-12-19 RX ADMIN — Medication SCH ML: at 09:32

## 2019-12-19 RX ADMIN — ASPIRIN 81 MG CHEWABLE TABLET SCH MG: 81 TABLET CHEWABLE at 09:13

## 2019-12-19 RX ADMIN — Medication SCH ML: at 21:12

## 2019-12-19 NOTE — PDOC.HOSPP
- Subjective


Subjective: 





Pt reports that he is feeling better today. 


Denies nausea this morning. He thinks the pepcid was the cause of this and 

since discontinuation states he is better.


He reports some dizziness today but states that it is improved. 





- Objective


Vital Signs & Weight: 


 Vital Signs (12 hours)











  Temp Pulse Resp BP BP Pulse Ox


 


 12/19/19 09:31   103 H    


 


 12/19/19 09:15   106 H    


 


 12/19/19 08:33       94 L


 


 12/19/19 08:22  97.6 F  107 H  18  108/63   94 L


 


 12/19/19 04:30      100/52 L 


 


 12/19/19 04:23  97.6 F  84  16    96


 


 12/19/19 00:35  98.0 F  88  18   90/40 L  96








 Weight











Weight                         202 lb














I&O: 


 











 12/18/19 12/19/19 12/20/19





 06:59 06:59 06:59


 


Intake Total 1630 930 


 


Output Total 2180 1425 


 


Balance -550 -495 











Result Diagrams: 


 12/17/19 10:27





 12/19/19 03:43





Hospitalist ROS





- Medication


Medications: 


Active Medications











Generic Name Dose Route Start Last Admin





  Trade Name Freq  PRN Reason Stop Dose Admin


 


Apixaban  5 mg  12/17/19 09:00  12/19/19 09:13





  Eliquis  PO   5 mg





  BID IZA   Administration





     





     





     





     


 


Aspirin  81 mg  12/14/19 09:00  12/19/19 09:13





  Aspirin Chewable  PO   81 mg





  DAILY IZA   Administration





     





     





     





     


 


Atorvastatin Calcium  40 mg  12/17/19 21:00  12/18/19 21:24





  Lipitor  PO   40 mg





  HS IZA   Administration





     





     





     





     


 


Carvedilol  3.125 mg  12/19/19 08:00  12/19/19 09:30





  Coreg  PO   3.125 mg





  BID-WM IZA   Administration





     





     





     





     


 


Cefuroxime Axetil  250 mg  12/14/19 21:00  12/19/19 09:12





  Ceftin  PO   250 mg





  Q12HR IZA   Administration





     





     





     





     


 


Digoxin  0.125 mg  12/19/19 09:00  12/19/19 09:31





  Lanoxin  PO  12/20/19 11:00  0.125 mg





  QAM IZA   Administration





     





     





     





     


 


Famotidine  20 mg  12/14/19 21:00  12/19/19 09:12





  Pepcid  PO   20 mg





  BID IZA   Administration





     





     





     





     


 


Promethazine HCl 12.5 mg/  50.5 mls @ 202 mls/hr  12/18/19 10:01  12/18/19 10:45





  Sodium Chloride  IVPB   50.5 mls





  Q6H PRN   Administration





  Nausea/Vomiting   





     





     





     


 


Ondansetron HCl  4 mg  12/18/19 08:51  12/18/19 09:03





  Zofran  IVP   4 mg





  Q6H PRN   Administration





  Nausea/Vomiting   





     





     





     


 


Saccharomyces Brunildadii  250 mg  12/14/19 21:00  12/19/19 09:12





  Florastor  PO   250 mg





  BID IZA   Administration





     





     





     





     


 


Sacubitril/Valsartan  1 tab  12/16/19 21:00  12/18/19 21:25





  Entresto 24 Mg-26 Mg Tablet  PO   Not Given





  BID IZA   





     





     





     





     


 


Sodium Chloride  10 ml  12/13/19 21:00  12/19/19 09:32





  Flush - Normal Saline  IVF   10 ml





  Q12HR IZA   Administration





     





     





     





     


 


Sodium Chloride  10 ml  12/13/19 18:16  12/15/19 06:02





  Flush - Normal Saline  IVF   10 ml





  PRN PRN   Administration





  Saline Flush   





     





     





     


 


Spironolactone  50 mg  12/18/19 08:00  12/19/19 09:30





  Aldactone  PO   50 mg





  QAM-WM IZA   Administration





     





     





     





     














- Exam


General Appearance: NAD, awake alert


Heart: no murmur, no gallops, irregular


Respiratory: CTAB, no wheezes, no rales, no ronchi, normal chest expansion, no 

tachypnea


Gastrointestinal: soft, non-tender, non-distended


Extremities: no edema





Hosp A/P


(1) Cardiac volume overload


Code(s): E87.79 - OTHER FLUID OVERLOAD   Status: Acute   





(2) Acute on chronic systolic and diastolic heart failure, NYHA class 3


Code(s): I50.43 - ACUTE ON CHRONIC COMBINED SYSTOLIC AND DIASTOLIC HRT FAIL   

Status: Acute   





(3) Cardiomyopathy


Code(s): I42.9 - CARDIOMYOPATHY, UNSPECIFIED   Status: Chronic   





(4) Bronchitis


Code(s): J40 - BRONCHITIS, NOT SPECIFIED AS ACUTE OR CHRONIC   Status: Acute   





(5) Diarrhea


Code(s): R19.7 - DIARRHEA, UNSPECIFIED   Status: Acute   





(6) Obesity (BMI 30.0-34.9)


Code(s): E66.9 - OBESITY, UNSPECIFIED   Status: Chronic   





(7) Elevated bilirubin


Code(s): R17 - UNSPECIFIED JAUNDICE   Status: Acute   





(8) Atrial fibrillation


Code(s): I48.91 - UNSPECIFIED ATRIAL FIBRILLATION   Status: Chronic   





(9) HTN (hypertension)


Code(s): I10 - ESSENTIAL (PRIMARY) HYPERTENSION   Status: Chronic   





(10) Polycythemia


Code(s): D75.1 - SECONDARY POLYCYTHEMIA   Status: Acute   





(11) Hypokalemia


Code(s): E87.6 - HYPOKALEMIA   Status: Acute   





- Plan





CHF:


On spironolactone and Entresto. 


Discontinued Lasix today due to elevated creatinine.


Will check creatinine again tomorrow morning.


Reports a hx of EF of 20% from echo from 2012. 


EP consulted. Pt decided to wait on AICD and attempt max medical management for 

3 months. 


Pt declined LifeVest at this time. 


Will follow with Matthew. 





Hypotension:


Improved today after receiving IVF yesterday. 


Will continue to monitor today. 





Cardiomyopathy:


Non-ischemic.


Continue afterload reduction and beta blocker.


Cath negative for significant ischemic disease.





Bronchitis:


Start po ceftin in light of the decompensated CHF.


Continues to have some cough when supine, but stable.





Diarrhea:


Resolved.


Continue Florastor. 








Elevated Bili:


Likely passive liver congestion from CHF.


Continue to monitor.





Atrial fibrillation:


Rate well controlled.


Sounds like it is chronic and was known to be present in 2012.


On Eliquis.


Cath revealed no significant blockages.


Continue beta blocker.








HTN:


Continue metoprolol, Entresto, and Spironolactone. 





Polycythemia:


May be secondary to chronic chf.





Hypokalemia:


Replaced orally.





DVT proph:


On Eliquis.





PUD proph:


H2 antagonist.





Will have patient walk with PT today.


Plan to discharge tomorrow if creatinine stable.

## 2019-12-19 NOTE — PRG
DATE OF SERVICE:  12/19/2019



SUBJECTIVE:  Mr. Hernandes continues to improve from the CHF standpoint. His

 infectious issues also have resolved.   On the other hand, he developed long 

nonsustained ventricular tachyarrhythmia run.  He was mildly symptomatic in 
this regard.  

He does not pass out.  No stroke-like symptoms.  No neurological deficits.  No 
fever,

chills, or cough.  Rest of 12-point review of system otherwise unremarkable. 



OBJECTIVE DATA:  VITAL SIGNS:  Blood pressure is 155/99, heart rate 71, 
respirations

16, and the patient is afebrile. 

GENERAL:  Alert and oriented man, in no apparent distress. 

NECK:  Supple.  Jugular veins not distended. 

CHEST:  Coarse without crackles. 

HEART:  Sounds are regular to rate and rhythm.  No murmur or gallop. 

ABDOMEN:  Benign.  Bowel sounds positive. 

EXTREMITIES:  Lower extremities without edema, clubbing, or cyanosis.  Pulses 
are

adequate. 

NEUROLOGIC:  The patient is nonfocal. 

MUSCULOSKELETAL:  Without joint swelling or deformities. 

SKIN:  Without rash.



DATABASE:  Telemetry strips reviewed, revealing chronic atrial fibrillation 
with an

episode of ventricular tachycardia with rapid rates noted, lasting about 20 
seconds.

 The cycle lengths were up to 320 milliseconds. 



LABORATORY DATA:  White blood cell count is 5.3, hemoglobin 17.5, and platelet 
count

is 158 on the 16th.  Sodium 139, potassium 4, BUN is 31, creatinine 1.55, mildly

worsened from before. 



MEDICATIONS:  Med list reveals Eliquis on board.



ASSESSMENT AND PLAN:  Mr. Hernandes is a pleasant 64-year-old man with prior 
history

of congestive heart failure and nonischemic cardiomyopathy.  He reports severely

reduced left ventricular ejection fraction in 2012.  Now, he is returning with

continued severely reduced left ventricular ejection fraction.  He also has

significant ventricular ectopy nor 20 seconds.  He has chronic persisting atrial

fibrillation. 



Again, we discussed the risks of ventricular arrhythmias, hence his long 
ventricular

arrhythmias and evidence of cardiomyopathy in the past as well as continued 
severely

reduced left ventricular ejection fraction despite of adequate medical therapy, 
it

would be reasonable to proceed with implantable cardiac defibrillator implant.

After discussing risk and reviewing the ventricular tachycardia findings, he is 
more

agreeable now.  We will plan to proceed with implantable cardiac defibrillator

implant tomorrow and the chronic atrial fibrillation, likely single-chamber 
device

will be suffice. 



Risks and benefits of procedure were discussed.  He understands and willing to

proceed.  We discussed risks of infection, bleeding, pneumothorax, tamponade, 
device

malfunctions, and recalls.  We will keep him without food. 



Thank you again for allowing me to participate in the care of this patient.







Job ID:  [id number]



MAURA

## 2019-12-20 LAB
ANION GAP SERPL CALC-SCNC: 18 MMOL/L (ref 10–20)
BASOPHILS # BLD AUTO: 0.1 THOU/UL (ref 0–0.2)
BASOPHILS NFR BLD AUTO: 1.2 % (ref 0–1)
BUN SERPL-MCNC: 32 MG/DL (ref 8.4–25.7)
CALCIUM SERPL-MCNC: 9.2 MG/DL (ref 7.8–10.44)
CHLORIDE SERPL-SCNC: 90 MMOL/L (ref 98–107)
CO2 SERPL-SCNC: 31 MMOL/L (ref 23–31)
CREAT CL PREDICTED SERPL C-G-VRATE: 87 ML/MIN (ref 70–130)
EOSINOPHIL # BLD AUTO: 0.1 THOU/UL (ref 0–0.7)
EOSINOPHIL NFR BLD AUTO: 1.7 % (ref 0–10)
GLUCOSE SERPL-MCNC: 96 MG/DL (ref 80–115)
HGB BLD-MCNC: 19.1 G/DL (ref 14–18)
LYMPHOCYTES # BLD: 2 THOU/UL (ref 1.2–3.4)
LYMPHOCYTES NFR BLD AUTO: 26.7 % (ref 21–51)
MCH RBC QN AUTO: 30.8 PG (ref 27–31)
MCV RBC AUTO: 96 FL (ref 78–98)
MONOCYTES # BLD AUTO: 1 THOU/UL (ref 0.11–0.59)
MONOCYTES NFR BLD AUTO: 13.7 % (ref 0–10)
NEUTROPHILS # BLD AUTO: 4.3 THOU/UL (ref 1.4–6.5)
NEUTROPHILS NFR BLD AUTO: 56.6 % (ref 42–75)
PLATELET # BLD AUTO: 184 THOU/UL (ref 130–400)
POTASSIUM SERPL-SCNC: 5.7 MMOL/L (ref 3.5–5.1)
RBC # BLD AUTO: 6.2 MILL/UL (ref 4.7–6.1)
SODIUM SERPL-SCNC: 133 MMOL/L (ref 136–145)
WBC # BLD AUTO: 7.6 THOU/UL (ref 4.8–10.8)

## 2019-12-20 PROCEDURE — 02HK3KZ INSERTION OF DEFIBRILLATOR LEAD INTO RIGHT VENTRICLE, PERCUTANEOUS APPROACH: ICD-10-PCS | Performed by: INTERNAL MEDICINE

## 2019-12-20 PROCEDURE — 0JH608Z INSERTION OF DEFIBRILLATOR GENERATOR INTO CHEST SUBCUTANEOUS TISSUE AND FASCIA, OPEN APPROACH: ICD-10-PCS | Performed by: INTERNAL MEDICINE

## 2019-12-20 RX ADMIN — ASPIRIN 81 MG CHEWABLE TABLET SCH MG: 81 TABLET CHEWABLE at 12:07

## 2019-12-20 RX ADMIN — Medication SCH ML: at 21:08

## 2019-12-20 RX ADMIN — Medication SCH ML: at 12:07

## 2019-12-20 RX ADMIN — CEFAZOLIN SODIUM SCH MLS: 1 SOLUTION INTRAVENOUS at 21:09

## 2019-12-20 RX ADMIN — CEFAZOLIN SODIUM SCH MLS: 1 SOLUTION INTRAVENOUS at 14:13

## 2019-12-20 RX ADMIN — ONDANSETRON PRN MG: 2 INJECTION INTRAMUSCULAR; INTRAVENOUS at 06:07

## 2019-12-20 NOTE — RAD
XR Chest 1 View Portable



History: Post cardiac device placement



Comparison: Radiograph December 13, 2019



Findings: New single-lead defibrillator is in place with lead collecting over the right ventricle. He
art size is enlarged. Pulmonary arteries are dilated. Low-grade pulmonary venous congestion. No

pneumothorax.



Impression: Uncomplicated placement single-lead cardiac defibrillator.



Reported By: Kike Rich 

Electronically Signed:  12/20/2019 11:38 AM

## 2019-12-20 NOTE — PDOC.HOSPP
- Subjective


Subjective: 





Tolerated the ICD placement well.  No other complaints other than mild 

lightheadedness from time to time, but manageable.  He thinks it has been 

related to eating orange sherbert. 





- Objective


Vital Signs & Weight: 


 Vital Signs (12 hours)











  Temp Pulse Pulse Pulse Resp BP BP


 


 12/20/19 15:45  97.5 F L  91    18  


 


 12/20/19 15:35    91  87   108/71  95/52 L


 


 12/20/19 12:06   76     


 


 12/20/19 10:45  97.2 F L  73    17  


 


 12/20/19 08:00  97.6 F  76    17  


 


 12/20/19 04:00  97.9 F  79    16  














  BP BP Pulse Ox Pulse Ox Pulse Ox


 


 12/20/19 15:45   108/71  95  


 


 12/20/19 15:35     95  94 L


 


 12/20/19 12:06     


 


 12/20/19 10:45   127/80  95  


 


 12/20/19 08:00   93/61  96  


 


 12/20/19 04:00  109/62   92 L  








 Weight











Weight                         200 lb 11.2 oz














I&O: 


 











 12/19/19 12/20/19 12/21/19





 06:59 06:59 06:59


 


Intake Total 930 480 


 


Output Total 1423 0535 


 


Balance -495 -1195 











Result Diagrams: 


 12/20/19 04:31





 12/20/19 04:31


Additional Labs: 


 Accuchecks











  12/19/19





  20:47


 


POC Glucose  105














Hospitalist ROS





- Medication


Medications: 


Active Medications











Generic Name Dose Route Start Last Admin





  Trade Name Freq  PRN Reason Stop Dose Admin


 


Apixaban  5 mg  12/17/19 09:00  12/19/19 09:13





  Eliquis  PO   5 mg





  BID IZA   Administration





     





     





     





     


 


Aspirin  81 mg  12/14/19 09:00  12/20/19 12:07





  Aspirin Chewable  PO   81 mg





  DAILY IZA   Administration





     





     





     





     


 


Atorvastatin Calcium  40 mg  12/17/19 21:00  12/19/19 21:12





  Lipitor  PO   40 mg





  HS IZA   Administration





     





     





     





     


 


Carvedilol  3.125 mg  12/19/19 08:00  12/20/19 05:31





  Coreg  PO   3.125 mg





  BID-WM IZA   Administration





     





     





     





     


 


Cephalexin  500 mg  12/20/19 12:00  12/20/19 12:07





  Keflex  PO   500 mg





  Q6HR IZA   Administration





     





     





     





     


 


Famotidine  20 mg  12/14/19 21:00  12/20/19 12:06





  Pepcid  PO   20 mg





  BID IZA   Administration





     





     





     





     


 


Promethazine HCl 12.5 mg/  50.5 mls @ 202 mls/hr  12/18/19 10:01  12/18/19 10:45





  Sodium Chloride  IVPB   50.5 mls





  Q6H PRN   Administration





  Nausea/Vomiting   





     





     





     


 


Cefazolin Sodium/Dextrose 1 gm  50 mls @ 100 mls/hr  12/20/19 14:00  12/20/19 14

:13





  / Device  IVPB   50 mls





  Q8HR IZA   Administration





     





     





     





     


 


Ondansetron HCl  4 mg  12/18/19 08:51  12/20/19 06:07





  Zofran  IVP   4 mg





  Q6H PRN   Administration





  Nausea/Vomiting   





     





     





     


 


Saccharomyces Boulardii  250 mg  12/14/19 21:00  12/20/19 12:05





  Florastor  PO   250 mg





  BID IZA   Administration





     





     





     





     


 


Sacubitril/Valsartan  1 tab  12/16/19 21:00  12/20/19 12:05





  Entresto 24 Mg-26 Mg Tablet  PO   1 tab





  BID IZA   Administration





     





     





     





     


 


Sodium Chloride  10 ml  12/13/19 21:00  12/20/19 12:07





  Flush - Normal Saline  IVF   10 ml





  Q12HR IZA   Administration





     





     





     





     


 


Sodium Chloride  10 ml  12/13/19 18:16  12/15/19 06:02





  Flush - Normal Saline  IVF   10 ml





  PRN PRN   Administration





  Saline Flush   





     





     





     


 


Spironolactone  50 mg  12/18/19 08:00  12/20/19 12:05





  Aldactone  PO   Not Given





  QA- IZA   





     





     





     





     














- Exam


General Appearance: NAD, awake alert


Heart: RRR, no murmur, no gallops, no rubs, normal peripheral pulses


Respiratory: CTAB, no wheezes, no rales, no ronchi, normal chest expansion, no 

tachypnea, normal percussion


Gastrointestinal: soft, non-tender, non-distended, normal bowel sounds, no 

palpable masses, no hepatomegaly, no splenomegaly, no bruit


Skin: normal turgor, no lesions, no rashes


Musculoskeletal: normal tone, normal strength, no muscle wasting


Psychiatric: normal affect, normal behavior, A&O x 3





Hosp A/P


(1) Cardiac volume overload


Code(s): E87.79 - OTHER FLUID OVERLOAD   Status: Acute   





(2) Acute on chronic systolic and diastolic heart failure, NYHA class 3


Code(s): I50.43 - ACUTE ON CHRONIC COMBINED SYSTOLIC AND DIASTOLIC HRT FAIL   

Status: Acute   





(3) Cardiomyopathy


Code(s): I42.9 - CARDIOMYOPATHY, UNSPECIFIED   Status: Chronic   





(4) Bronchitis


Code(s): J40 - BRONCHITIS, NOT SPECIFIED AS ACUTE OR CHRONIC   Status: Acute   





(5) Diarrhea


Code(s): R19.7 - DIARRHEA, UNSPECIFIED   Status: Acute   





(6) Obesity (BMI 30.0-34.9)


Code(s): E66.9 - OBESITY, UNSPECIFIED   Status: Chronic   





(7) Elevated bilirubin


Code(s): R17 - UNSPECIFIED JAUNDICE   Status: Acute   





(8) Atrial fibrillation


Code(s): I48.91 - UNSPECIFIED ATRIAL FIBRILLATION   Status: Chronic   





(9) HTN (hypertension)


Code(s): I10 - ESSENTIAL (PRIMARY) HYPERTENSION   Status: Chronic   





(10) Polycythemia


Code(s): D75.1 - SECONDARY POLYCYTHEMIA   Status: Acute   





(11) Hypokalemia


Code(s): E87.6 - HYPOKALEMIA   Status: Acute   





- Plan





CHF:


On spironolactone and Entresto. 


Lasix was held due to hypotension and pre-renal azotemia.  


Hyperkalemia this morning with unopposed aldactone.  


Anticipate he will need lower dose of both at discharge (40,25)


Will check creatinine again tomorrow morning.


Lost 40 pounds of fluid this admission. 








Hypotension:


Improved today after receiving IVF yesterday. 


Will continue to monitor today. 





Cardiomyopathy:


Non-ischemic.  EF 10-15%


Severe MR.


Continue afterload reduction and beta blocker.


Cath negative for significant ischemic disease.


Initially declined ICD, but ultimately agreed and that was placed today.





Bronchitis:


Stopped Ceftin.


Started on Keflex for ICD placement.


Still has a little bit of a productive cough with discolored sputum. 


May be sinusitis. 





Diarrhea:


Resolved.


Continue Florastor. 





Elevated Bili:


Likely passive liver congestion from CHF.





Atrial fibrillation:


Rate well controlled.


Sounds like it is chronic and was known to be present in 2012.


On Eliquis.


Cath revealed no significant blockages.


Continue beta blocker.








HTN:


Continue coreg, Entresto, and Spironolactone. 





Polycythemia:


May be secondary to chronic chf.





Hypokalemia:


Replaced orally.





DVT proph:


On Eliquis.





PUD proph:


H2 antagonist.





Anticipate discharge in am if does ok on the monitor overnight after ICD placed 

today.

## 2019-12-21 LAB
ANION GAP SERPL CALC-SCNC: 16 MMOL/L (ref 10–20)
BUN SERPL-MCNC: 30 MG/DL (ref 8.4–25.7)
CALCIUM SERPL-MCNC: 9.3 MG/DL (ref 7.8–10.44)
CHLORIDE SERPL-SCNC: 95 MMOL/L (ref 98–107)
CO2 SERPL-SCNC: 28 MMOL/L (ref 23–31)
CREAT CL PREDICTED SERPL C-G-VRATE: 89 ML/MIN (ref 70–130)
GLUCOSE SERPL-MCNC: 120 MG/DL (ref 80–115)
POTASSIUM SERPL-SCNC: 4.7 MMOL/L (ref 3.5–5.1)
SODIUM SERPL-SCNC: 134 MMOL/L (ref 136–145)

## 2019-12-21 RX ADMIN — SACUBITRIL AND VALSARTAN SCH: 49; 51 TABLET, FILM COATED ORAL at 21:01

## 2019-12-21 RX ADMIN — Medication SCH ML: at 21:00

## 2019-12-21 RX ADMIN — ASPIRIN 81 MG CHEWABLE TABLET SCH MG: 81 TABLET CHEWABLE at 08:20

## 2019-12-21 RX ADMIN — CEFAZOLIN SODIUM SCH MLS: 1 SOLUTION INTRAVENOUS at 05:06

## 2019-12-21 RX ADMIN — Medication SCH ML: at 08:20

## 2019-12-21 NOTE — PDOC.HOSPP
- Subjective


Encounter Date: 12/21/19


Encounter Time: 11:30


Subjective: 





 The patient states this morning he felt very nauseous and vomited four times. 

His blood pressure afterwards was 80 systolic while sitting up.  The patient 

feels he is getting too much medication and they are trying too hard to get to 

his normal weight, but he states his body can't tolerate it and would like to 

back off.  The patient does not want any anti-emetics 





Denies chest pain or shortness of breath. He reports having lost 50 pounds 

while in the hospital, was 250 pounds when he initially came in.  He follows 

with Dr. Castro and says he normally does better as an outpatient. 





Per nursing staff he received his coreg and entresto this morning, did not get 

aldactone or lasix








- Objective


Vital Signs & Weight: 


 Vital Signs (12 hours)











  Temp Pulse Resp BP BP Pulse Ox


 


 12/21/19 08:09  97.8 F  87  18   118/65  94 L


 


 12/21/19 03:32  98.4 F  91  18  116/69   92 L








 Weight











Weight                         202 lb 1.6 oz














I&O: 


 











 12/20/19 12/21/19 12/22/19





 06:59 06:59 06:59


 


Intake Total 480 250 


 


Output Total 7975 900 


 


Balance -1195 -650 











Result Diagrams: 


 12/20/19 04:31





 12/21/19 07:47





Hospitalist ROS





- Review of Systems


Constitutional: denies: fever, chills





- Medication


Medications: 


Active Medications











Generic Name Dose Route Start Last Admin





  Trade Name Freq  PRN Reason Stop Dose Admin


 


Apixaban  5 mg  12/17/19 09:00  12/19/19 09:13





  Eliquis  PO   5 mg





  BID IZA   Administration





     





     





     





     


 


Aspirin  81 mg  12/14/19 09:00  12/21/19 08:20





  Aspirin Chewable  PO   81 mg





  DAILY IZA   Administration





     





     





     





     


 


Atorvastatin Calcium  40 mg  12/17/19 21:00  12/20/19 21:08





  Lipitor  PO   40 mg





  HS IZA   Administration





     





     





     





     


 


Carvedilol  3.125 mg  12/19/19 08:00  12/21/19 08:19





  Coreg  PO   3.125 mg





  BID-WM IZA   Administration





     





     





     





     


 


Cephalexin  500 mg  12/20/19 12:00  12/21/19 05:06





  Keflex  PO   500 mg





  Q6HR IZA   Administration





     





     





     





     


 


Famotidine  20 mg  12/14/19 21:00  12/21/19 08:20





  Pepcid  PO   20 mg





  BID IZA   Administration





     





     





     





     


 


Promethazine HCl 12.5 mg/  50.5 mls @ 202 mls/hr  12/18/19 10:01  12/18/19 10:45





  Sodium Chloride  IVPB   50.5 mls





  Q6H PRN   Administration





  Nausea/Vomiting   





     





     





     


 


Cefazolin Sodium/Dextrose 1 gm  50 mls @ 100 mls/hr  12/20/19 14:00  12/21/19 05

:06





  / Device  IVPB   50 mls





  Q8HR IZA   Administration





     





     





     





     


 


Ondansetron HCl  4 mg  12/18/19 08:51  12/20/19 06:07





  Zofran  IVP   4 mg





  Q6H PRN   Administration





  Nausea/Vomiting   





     





     





     


 


Saccharomyces Boulardii  250 mg  12/14/19 21:00  12/21/19 08:20





  Florastor  PO   250 mg





  BID IZA   Administration





     





     





     





     


 


Sacubitril/Valsartan  1 tab  12/16/19 21:00  12/21/19 08:20





  Entresto 24 Mg-26 Mg Tablet  PO   1 tab





  BID IZA   Administration





     





     





     





     


 


Sodium Chloride  10 ml  12/13/19 21:00  12/21/19 08:20





  Flush - Normal Saline  IVF   10 ml





  Q12HR IZA   Administration





     





     





     





     


 


Sodium Chloride  10 ml  12/13/19 18:16  12/15/19 06:02





  Flush - Normal Saline  IVF   10 ml





  PRN PRN   Administration





  Saline Flush   





     





     





     


 


Spironolactone  50 mg  12/18/19 08:00  12/20/19 12:05





  Aldactone  PO   Not Given





  QAM- IZA   





     





     





     





     














- Exam


General Appearance: NAD, awake alert


Eye: PERRL, anicteric sclera


ENT: normocephalic atraumatic, no oropharyngeal lesions


Neck: supple, symmetric, no JVD, no thyromegaly


Heart: RRR, no murmur, no gallops, no rubs


Respiratory: CTAB, no wheezes, no rales, no ronchi, no tachypnea


Gastrointestinal: soft, non-tender, non-distended, normal bowel sounds


Extremities: no cyanosis


Extremities - other findings: Some ankle edema and shin edema bilaterally, R> L

, nonpitting 


Skin: normal turgor, no lesions, no rashes


Musculoskeletal: normal tone, normal strength, no muscle wasting





Hosp A/P


(1) Hypotension


Status: Acute   





(2) Hyponatremia


Code(s): E87.1 - HYPO-OSMOLALITY AND HYPONATREMIA   Status: Acute   





(3) Acute on chronic systolic and diastolic heart failure, NYHA class 3


Code(s): I50.43 - ACUTE ON CHRONIC COMBINED SYSTOLIC AND DIASTOLIC HRT FAIL   

Status: Acute   





(4) Polycythemia


Code(s): D75.1 - SECONDARY POLYCYTHEMIA   Status: Acute   





(5) HTN (hypertension)


Code(s): I10 - ESSENTIAL (PRIMARY) HYPERTENSION   Status: Chronic   





- Plan


ECHO 12/14: EF 15-20%, moderately dilated LA, mildly enlarged RA, severe MR, 

mild AR, mild TR, mild AR, dilated IVC, LV size moderately enlarged, moderately 

enlarged RV cavity, mildly enlarged RA, moderately dilated LA


Chest X ray 12/20: mild pulmonary venous congestion. S/p single lead ICD over 

RV 


Cardiac cath 12/16:  normal with 20% stenosis LAD and RCA, 40% left circumflex





This is a 64 year old male with past medical history of systolic heart failure, 

atrial fibrillation, hypertension who presented to the emergency room with PND, 

orthopnea, dyspnea, LE swelling, admitted for acute CHF exacerbation, s/p 50 

pounds of diuresis. 








Plan: 








#Acute Systolic CHF with EF 15-20% with severe mitral regurgitation 


#Hypotension


#Nausea/Vomiting


#Atrial fibrillation s/p pacemaker placement 


- given nausea/vomiting/ hypotension will hold entresto for night. Coreg can be 

given but hold if systolic < 90.  Chest x ray 12/20 showed mild pulmonary 

venous congestion. Continue to hold lasix and aldactone for now


- s/p single lead defibrillator placement on 12/20. Cardiac cath 12/16 showed 

no significant blockages











#Hyponatremia


- sodium improving to 134


- continue to hold lasix 





#Polycythemia


- Hb 19, possibly dehydration, will hold antihypertensives today














Disposition:  d/c when blood pressure improves


GI prophylaxis: H2 blocker


DVT prophylaxis: eliquis


Code status: full code

## 2019-12-22 VITALS — DIASTOLIC BLOOD PRESSURE: 87 MMHG | SYSTOLIC BLOOD PRESSURE: 143 MMHG

## 2019-12-22 VITALS — TEMPERATURE: 99.7 F

## 2019-12-22 LAB
ALBUMIN SERPL BCG-MCNC: 3 G/DL (ref 3.4–4.8)
ALP SERPL-CCNC: 128 U/L (ref 40–110)
ALT SERPL W P-5'-P-CCNC: 10 U/L (ref 8–55)
ANION GAP SERPL CALC-SCNC: 14 MMOL/L (ref 10–20)
AST SERPL-CCNC: 25 U/L (ref 5–34)
BILIRUB SERPL-MCNC: 2.3 MG/DL (ref 0.2–1.2)
BUN SERPL-MCNC: 28 MG/DL (ref 8.4–25.7)
CALCIUM SERPL-MCNC: 9 MG/DL (ref 7.8–10.44)
CHLORIDE SERPL-SCNC: 95 MMOL/L (ref 98–107)
CO2 SERPL-SCNC: 30 MMOL/L (ref 23–31)
CREAT CL PREDICTED SERPL C-G-VRATE: 110 ML/MIN (ref 70–130)
GLOBULIN SER CALC-MCNC: 3.5 G/DL (ref 2.4–3.5)
GLUCOSE SERPL-MCNC: 101 MG/DL (ref 80–115)
HGB BLD-MCNC: 18.2 G/DL (ref 14–18)
MCH RBC QN AUTO: 32 PG (ref 27–31)
MCV RBC AUTO: 95.9 FL (ref 78–98)
PLATELET # BLD AUTO: 162 THOU/UL (ref 130–400)
POTASSIUM SERPL-SCNC: 4.5 MMOL/L (ref 3.5–5.1)
RBC # BLD AUTO: 5.69 MILL/UL (ref 4.7–6.1)
SODIUM SERPL-SCNC: 134 MMOL/L (ref 136–145)
WBC # BLD AUTO: 7.6 THOU/UL (ref 4.8–10.8)

## 2019-12-22 RX ADMIN — SACUBITRIL AND VALSARTAN SCH TAB: 49; 51 TABLET, FILM COATED ORAL at 08:23

## 2019-12-22 RX ADMIN — ASPIRIN 81 MG CHEWABLE TABLET SCH MG: 81 TABLET CHEWABLE at 08:12

## 2019-12-22 RX ADMIN — Medication SCH ML: at 08:15

## 2019-12-22 NOTE — ULT
EXAM:

Right upper quadrant ultrasound



PROVIDED CLINICAL HISTORY:

Nausea and vomiting



COMPARISON:

None



FINDINGS:

Visualized portions of the pancreas appear normal. Liver demonstrates no mass or intrahepatic biliary
 ductal dilatation. Common duct is nondilated. Gallbladder demonstrates no stones, wall thickening

or pericholecystic fluid. Right kidney demonstrates no hydronephrosis or mass.



IMPRESSION:

Unremarkable right upper quadrant ultrasound.



Reported By: Neal Ferrari 

Electronically Signed:  12/22/2019 1:51 PM

## 2020-02-25 ENCOUNTER — HOSPITAL ENCOUNTER (OUTPATIENT)
Dept: HOSPITAL 92 - LABBT | Age: 65
Discharge: HOME | End: 2020-02-25
Attending: INTERNAL MEDICINE
Payer: SELF-PAY

## 2020-02-25 DIAGNOSIS — I48.91: ICD-10-CM

## 2020-02-25 DIAGNOSIS — Z01.812: Primary | ICD-10-CM

## 2020-02-25 LAB
ANION GAP SERPL CALC-SCNC: 11 MMOL/L (ref 10–20)
APTT PPP: 36.7 SEC (ref 22.9–36.1)
BUN SERPL-MCNC: 15 MG/DL (ref 8.4–25.7)
CALCIUM SERPL-MCNC: 9.1 MG/DL (ref 7.8–10.44)
CHLORIDE SERPL-SCNC: 106 MMOL/L (ref 98–107)
CO2 SERPL-SCNC: 27 MMOL/L (ref 23–31)
CREAT CL PREDICTED SERPL C-G-VRATE: 0 ML/MIN (ref 70–130)
GLUCOSE SERPL-MCNC: 93 MG/DL (ref 80–115)
HGB BLD-MCNC: 13.7 G/DL (ref 14–18)
INR PPP: 1.6
MCH RBC QN AUTO: 32.5 PG (ref 27–31)
MCV RBC AUTO: 94.4 FL (ref 78–98)
PLATELET # BLD AUTO: 146 THOU/UL (ref 130–400)
POTASSIUM SERPL-SCNC: 4.2 MMOL/L (ref 3.5–5.1)
PROTHROMBIN TIME: 18.9 SEC (ref 12–14.7)
RBC # BLD AUTO: 4.22 MILL/UL (ref 4.7–6.1)
SODIUM SERPL-SCNC: 140 MMOL/L (ref 136–145)
WBC # BLD AUTO: 5.3 THOU/UL (ref 4.8–10.8)

## 2020-02-25 PROCEDURE — 80048 BASIC METABOLIC PNL TOTAL CA: CPT

## 2020-02-25 PROCEDURE — 85610 PROTHROMBIN TIME: CPT

## 2020-02-25 PROCEDURE — 85730 THROMBOPLASTIN TIME PARTIAL: CPT

## 2020-02-25 PROCEDURE — 85027 COMPLETE CBC AUTOMATED: CPT

## 2020-02-26 ENCOUNTER — HOSPITAL ENCOUNTER (OUTPATIENT)
Dept: HOSPITAL 92 - CCL | Age: 65
Discharge: HOME | End: 2020-02-26
Attending: INTERNAL MEDICINE
Payer: SELF-PAY

## 2020-02-26 VITALS — BODY MASS INDEX: 31.9 KG/M2

## 2020-02-26 DIAGNOSIS — I42.0: ICD-10-CM

## 2020-02-26 DIAGNOSIS — Z95.810: ICD-10-CM

## 2020-02-26 DIAGNOSIS — Z79.899: ICD-10-CM

## 2020-02-26 DIAGNOSIS — I50.23: ICD-10-CM

## 2020-02-26 DIAGNOSIS — I48.0: Primary | ICD-10-CM

## 2020-02-26 DIAGNOSIS — I25.10: ICD-10-CM

## 2020-02-26 DIAGNOSIS — Z79.01: ICD-10-CM

## 2020-02-26 DIAGNOSIS — Z79.82: ICD-10-CM

## 2020-02-26 PROCEDURE — 93005 ELECTROCARDIOGRAM TRACING: CPT

## 2020-02-26 PROCEDURE — 92960 CARDIOVERSION ELECTRIC EXT: CPT

## 2020-02-26 PROCEDURE — 93010 ELECTROCARDIOGRAM REPORT: CPT

## 2020-02-26 PROCEDURE — 5A2204Z RESTORATION OF CARDIAC RHYTHM, SINGLE: ICD-10-PCS | Performed by: INTERNAL MEDICINE

## 2020-02-26 NOTE — OP
DATE OF PROCEDURE:  02/26/2020



PROCEDURE PERFORMED:  Electrocardioversion.



INDICATIONS:  A 64-year-old gentleman with atrial fibrillation and cardiomyopathy.



DESCRIPTION OF PROCEDURE:  The patient was taken to the PACU.  The patient was

sedated by Anesthesiology.  The patient was shocked with 200 joules of synchronized

electricity.  The patient was converted to normal sinus rhythm. 



IMPRESSION:  Successful electrocardioversion.







Job ID:  [id number]

## 2020-02-26 NOTE — EKG
Test Reason : PREOP

Blood Pressure : ***/*** mmHG

Vent. Rate : 092 BPM     Atrial Rate : 104 BPM

   P-R Int : 000 ms          QRS Dur : 098 ms

    QT Int : 392 ms       P-R-T Axes : 000 -54 083 degrees

   QTc Int : 484 ms

 

Atrial fibrillation

Left anterior fascicular block

Nonspecific T wave abnormality

cannot R/O anterior MI

Prolonged QT

Abnormal ECG

When compared with ECG of 13-DEC-2019 15:15,

Nonspecific T wave abnormality no longer evident in Inferior leads

Inverted T waves have replaced nonspecific T wave abnormality in Lateral leads

Confirmed by DR. ADIEL RICE ([id number]) on 2/26/2020 12:38:35 PM

 

Referred By:  GAVINO           Confirmed By:DR. ADIEL RICE

## 2022-07-15 NOTE — DIS
DATE OF ADMISSION:  12/13/2019



DATE OF DISCHARGE:  12/22/2019



DISCHARGE DIAGNOSES:  

 Acute on chronic systolic CHF, atrial fibrillationn s/p pacemaker placement, 
bronchitis, hyponatremia, polycythemia, non-sustained Vtach





CONSULTATIONS:  

1. Sulaiman Castro MD with Cardiology. 

2. Dom Iverson MD with Electrophysiology.



PROCEDURES:  

1. Cardiac catheterization on 12/16: which showed 20% stenosis in the LAD, 40%

stenosis in the left circumflex and 20% stenosis in the RCA. 

2. Pacemaker placement on 12/20.



BRIEF HISTORY OF PRESENT ILLNESS:  This is a 64-year-old male with past medical

history of hypertension, atrial fibrillation, CHF, CAD, who presented to the

emergency room with increasing lower extremity swelling, shortness of breath 
with

ambulation and orthopnea.  The patient's chest x-ray showed no acute disease, 
EKG

showed atrial fibrillation and BNP was elevated at 1519.  The patient was 
admitted

for CHF exacerbation. 



HOSPITAL COURSE: 

#Acute on chronic systolic CHF:  The patient was diuresed with IV Lasix in the

hospital, initially with 40 mg IV b.i.d. and then increased to 80 mg IV b.i.d.  
The

patient was switched to oral Lasix 40 mg b.i.d. on the 17th to 18th.  The 
patient

 lost over 50 pounds while in the hospital.  The patient was also started on 
spironolactone 25 mg and Entresto, Coreg, and digoxin.  The patient did start 
having some episodes of hypotension while standing with blood pressures in the 
80s.  Spironolactone and Lasix were subsequently held.  On day of discharge, the

patient reported no dizziness, nausea, or vomiting; however, did have some

nonspecific weakness while ambulating.  His blood pressure on the day of 
discharge

was 114/59.   He was discharged on Entresto half tablet twice daily, aspirin, 
atorvastatin, Coreg 3.125 twice daily.  He should follow up with Dr. Castro 
in a week and have him reassess whether furosemide and

spironolactone can be restarted. 



Atrial fibrillation/Non-sustained Vtach:  

The patient did have some atrial fibrillation during his

hospitalization.  He was started on Eliquis 5 mg twice daily. ECHO showed an EF 
of 15-20%. Cardiac cath was essentially unremarkable  as mentioned above. The 
patient

underwent a single lead ICD placement on 12/20.   The patient was advised by 
Dr. Iverson to

consider prophylactic ICD device placement.  The patient was hesitant and will

follow up with Dr. Castro and consideration of LifeVest can be discussed with 
him.

 

Bronchitis:  The patient reported some productive cough with increasing phlegm.

Chest x-ray on admission had shown no acute disease.  Chest x-ray on the 20th 
showed

low-grade pulmonary venous congestion.  He was diuresed and was given cefuroxime

during his hospitalization from the 14th to the 19, however, was switched to 
Keflex

on the 19th due to no improvement and anticipation of pacemaker placement.  He 
will take keflex for 6 mor days given pacemaker placement on the 20th.



Hyponatremia:  The patient had a sodium that dropped to 133.  His Lasix was 
held and

sodium went up to 134 on the day of discharge.  BMP can be repeated as an 
outpatient. 



Polycythemia:  The patient's hemoglobin is 19.  The patient appears to be

asymptomatic from this.  His hemoglobin improved to 18 on the day of discharge 
after

his blood pressure medications were reduced.  The patient will need a repeat 
CBC as

an outpatient. 



Nausea/vomiting/elevated ALP:  The patient reported four episodes of nausea and 
vomiting on the

21st, possibly was secondary to hypotension.  His alkaline phosphatase level was

elevated today at 128.  The patient underwent a right upper quadrant ultrasound 
on

which was unremarkable.  The patient can have a repeat ALP done as an

outpatient.  The patient denies any nausea today and was tolerating a diet. 



DISCHARGE PHYSICAL EXAM: 

 VITAL SIGNS:  Temperature 99.7, heart rate 90,

respiratory rate 17, O2 saturation 94% on room air, blood pressure 143/79. 

GENERAL:  The patient is alert, awake, oriented x3. 

CVS:  Regular rate and rhythm with no murmurs, rubs, or gallops. 

LUNGS:  Slightly diminished breath sounds at the bases. 

ABDOMEN:  Positive bowel sounds, soft, nontender, nondistended. 

EXTREMITIES:  The patient has some venous stasis changes in his lower 
extremities.

Trace edema, but it is nonpitting. 



PERTINENT LABORATORY DATA:  

CBC on 12/22 : WBC 7.6,

hemoglobin 18.2, hematocrit 54.5, platelets 162. 

BMP on 12/22: sodium 134, chloride 95, BUN 28, total bilirubin was 2.3. 

LFTs; AST 25, ALT 10, alkaline phosphatase 128. 

Troponin I on 12/13:  0.024. 

BMP on 12/13: 1519.6.



PERTINENT IMAGING STUDIES:  

Chest x-ray on 12/13:  no acute disease

Chest  X-ray 12/20:   shows single lead cardiac defibrillator placement over the

right ventricle.  Low grade pulmonary venous congestion. 

Abdominal ultrasound on 12/22: unremarkable with no biliary dilation. 

Echocardiogram on 12/14:  shows EF 15% to 20%, moderately dilated left atrium,

moderately enlarged right ventricle, severe MR, mild AR, mild TR. 



DISCHARGE CONDITION:  Stable for discharge to home.



DIET:  The patient to be on a heart healthy diet with 2 L fluid restriction.



DISCHARGE INSTRUCTIONS:  The patient to start taking Coreg 3.125 twice daily, 
half

tablet of Entresto, aspirin, statin.  He was advised to take Keflex until the 
26th

as prophylaxis due to pacemaker placement.  The patient is advised to follow up 
with

his  PCP in a week.  He should follow up with Dr. Castro within a week and 
may need

implantation of an ICD at some point.  The patient should have a repeat chest x-
ray

as  an outpatient if cough is persistent.  The patient should have his BMP 
level checked

in a week. 







Job ID:  [id number]



[facility]
[id number]